# Patient Record
Sex: FEMALE | Race: BLACK OR AFRICAN AMERICAN | NOT HISPANIC OR LATINO
[De-identification: names, ages, dates, MRNs, and addresses within clinical notes are randomized per-mention and may not be internally consistent; named-entity substitution may affect disease eponyms.]

---

## 2017-01-05 PROCEDURE — 88305 TISSUE EXAM BY PATHOLOGIST: CPT | Performed by: INTERNAL MEDICINE

## 2017-01-19 ENCOUNTER — CHARTING TRANS (OUTPATIENT)
Dept: OTHER | Age: 47
End: 2017-01-19

## 2017-02-07 ENCOUNTER — TELEPHONE (OUTPATIENT)
Dept: NEUROLOGY | Facility: CLINIC | Age: 47
End: 2017-02-07

## 2017-02-24 ENCOUNTER — TELEPHONE (OUTPATIENT)
Dept: NEUROLOGY | Facility: CLINIC | Age: 47
End: 2017-02-24

## 2017-02-24 ENCOUNTER — OFFICE VISIT (OUTPATIENT)
Dept: NEUROLOGY | Facility: CLINIC | Age: 47
End: 2017-02-24

## 2017-02-24 VITALS
WEIGHT: 134 LBS | RESPIRATION RATE: 18 BRPM | SYSTOLIC BLOOD PRESSURE: 110 MMHG | BODY MASS INDEX: 26 KG/M2 | HEART RATE: 64 BPM | DIASTOLIC BLOOD PRESSURE: 64 MMHG

## 2017-02-24 DIAGNOSIS — G35 MULTIPLE SCLEROSIS (HCC): Primary | ICD-10-CM

## 2017-02-24 DIAGNOSIS — Z76.89 ENCOUNTER TO ESTABLISH CARE: ICD-10-CM

## 2017-02-24 PROCEDURE — 99204 OFFICE O/P NEW MOD 45 MIN: CPT | Performed by: OTHER

## 2017-02-24 NOTE — PATIENT INSTRUCTIONS
Refill policies:    • Allow 2 business days for refills; controlled substances may take longer.   • Contact your pharmacy at least 5 days prior to running out of medication and have them send an electronic request or submit request through the “request re your physician has recommended that you have a procedure or additional testing performed. [facility]) will contact your insurance carrier to obtain pre-certification or prior authorization.     Unfortunately, GRANT has seen an increas

## 2017-02-24 NOTE — PROGRESS NOTES
HPI:    Patient ID: Jordan Van is a 55year old female. HPI    Vibha Kang is a 55year old female with history of Multiple sclerosis who presented to establish care with us.  She was diagnosed with MS in 2009 following cervical myelopathy symptom Prescriptions:  Interferon Beta-1a (AVONEX IM) Inject  into the muscle once a week. Disp:  Rfl:    omeprazole 20 MG Oral Capsule Delayed Release Take 1 capsule (20 mg total) by mouth daily.  Disp: 30 capsule Rfl: 11   Pantoprazole Sodium 40 MG Oral Tab EC T to shin test. Normal rapid alternating movements. Gait: Normal gait         ASSESSMENT/PLAN:   Multiple sclerosis (hcc)  (primary encounter diagnosis)  Encounter to establish care      Patient presented to establish care with us for MS.  She is neurologica

## 2017-03-01 NOTE — TELEPHONE ENCOUNTER
Rec'd fax from [address] stating that no authrization is required from [employer]. Strengths/Dosage approved: 63 & 94 mcg/0.5mL solution pen-injector, up to 1 kit per 180 days.     ID Number: [id number]  Case (Certification) Number: [id number]

## 2017-03-04 ENCOUNTER — LAB ENCOUNTER (OUTPATIENT)
Dept: LAB | Age: 47
End: 2017-03-04
Attending: Other
Payer: COMMERCIAL

## 2017-03-04 DIAGNOSIS — G35 MULTIPLE SCLEROSIS (HCC): ICD-10-CM

## 2017-03-04 LAB
25-HYDROXYVITAMIN D (TOTAL): 30.1 NG/ML (ref 30–100)
ALBUMIN SERPL-MCNC: 3.5 G/DL (ref 3.5–4.8)
ALP LIVER SERPL-CCNC: 44 U/L (ref 39–100)
ALT SERPL-CCNC: 27 U/L (ref 14–54)
AST SERPL-CCNC: 27 U/L (ref 15–41)
BASOPHILS # BLD AUTO: 0.06 X10(3) UL (ref 0–0.1)
BASOPHILS NFR BLD AUTO: 0.9 %
BILIRUB SERPL-MCNC: 0.3 MG/DL (ref 0.1–2)
BUN BLD-MCNC: 14 MG/DL (ref 8–20)
CALCIUM BLD-MCNC: 9.2 MG/DL (ref 8.3–10.3)
CHLORIDE: 103 MMOL/L (ref 101–111)
CO2: 27 MMOL/L (ref 22–32)
CREAT BLD-MCNC: 0.75 MG/DL (ref 0.55–1.02)
EOSINOPHIL # BLD AUTO: 0.27 X10(3) UL (ref 0–0.3)
EOSINOPHIL NFR BLD AUTO: 3.9 %
ERYTHROCYTE [DISTWIDTH] IN BLOOD BY AUTOMATED COUNT: 13.3 % (ref 11.5–16)
GLUCOSE BLD-MCNC: 94 MG/DL (ref 70–99)
HCT VFR BLD AUTO: 37.4 % (ref 34–50)
HGB BLD-MCNC: 12.3 G/DL (ref 12–16)
IMMATURE GRANULOCYTE COUNT: 0.05 X10(3) UL (ref 0–1)
IMMATURE GRANULOCYTE RATIO %: 0.7 %
LYMPHOCYTES # BLD AUTO: 2.74 X10(3) UL (ref 0.9–4)
LYMPHOCYTES NFR BLD AUTO: 39.3 %
M PROTEIN MFR SERPL ELPH: 7.4 G/DL (ref 6.1–8.3)
MCH RBC QN AUTO: 28.7 PG (ref 27–33.2)
MCHC RBC AUTO-ENTMCNC: 32.9 G/DL (ref 31–37)
MCV RBC AUTO: 87.2 FL (ref 81–100)
MONOCYTES # BLD AUTO: 0.54 X10(3) UL (ref 0.1–0.6)
MONOCYTES NFR BLD AUTO: 7.7 %
NEUTROPHIL ABS PRELIM: 3.31 X10 (3) UL (ref 1.3–6.7)
NEUTROPHILS # BLD AUTO: 3.31 X10(3) UL (ref 1.3–6.7)
NEUTROPHILS NFR BLD AUTO: 47.5 %
PLATELET # BLD AUTO: 279 10(3)UL (ref 150–450)
POTASSIUM SERPL-SCNC: 4 MMOL/L (ref 3.6–5.1)
RBC # BLD AUTO: 4.29 X10(6)UL (ref 3.8–5.1)
RED CELL DISTRIBUTION WIDTH-SD: 42.1 FL (ref 35.1–46.3)
SODIUM SERPL-SCNC: 137 MMOL/L (ref 136–144)
WBC # BLD AUTO: 7 X10(3) UL (ref 4–13)

## 2017-03-04 PROCEDURE — 36415 COLL VENOUS BLD VENIPUNCTURE: CPT

## 2017-03-04 PROCEDURE — 85025 COMPLETE CBC W/AUTO DIFF WBC: CPT

## 2017-03-04 PROCEDURE — 80053 COMPREHEN METABOLIC PANEL: CPT

## 2017-03-04 PROCEDURE — 82306 VITAMIN D 25 HYDROXY: CPT

## 2017-03-06 ENCOUNTER — TELEPHONE (OUTPATIENT)
Dept: NEUROLOGY | Facility: CLINIC | Age: 47
End: 2017-03-06

## 2017-03-06 NOTE — TELEPHONE ENCOUNTER
Patient states she has not received a call from [employer] regarding confirmation; however she states she has not checked her voicemail yet. Provided patient with [employer] phone number.  Patient verbalized understanding and has no further questions or concerns at

## 2017-03-06 NOTE — TELEPHONE ENCOUNTER
Relayed test results. Patient verbalized understanding and has no further questions or concerns at this time.

## 2017-03-09 ENCOUNTER — TELEPHONE (OUTPATIENT)
Dept: NEUROLOGY | Facility: CLINIC | Age: 47
End: 2017-03-09

## 2017-03-09 NOTE — TELEPHONE ENCOUNTER
Dr Ivan Redd asking if okay to do tooth extraction with patient on Betaseron. Dr Huerta Coil not in this office today. Per Dr Юлия Steward, as long as patient has not just started Betaseron, it should be okay. Dr Ivan eRdd informed and verbalized understanding.

## 2017-03-13 NOTE — TELEPHONE ENCOUNTER
Dr. Angela Merida received medical records, reviewed, and initialed medical records. Medical records sent to scan.

## 2017-03-20 ENCOUNTER — TELEPHONE (OUTPATIENT)
Dept: NEUROLOGY | Facility: CLINIC | Age: 47
End: 2017-03-20

## 2017-03-20 NOTE — TELEPHONE ENCOUNTER
See TE 2/24/17- patient may be checking on the status of Plegridy injections. Left detailed message for patient with Silva [phone] and to call office back if patient has any further questions.

## 2017-04-03 NOTE — TELEPHONE ENCOUNTER
Received fax from [employer] requesting product replacement for Plegridy 63 mcg pen. Request initiated and placed on Dr Charly Colbert desk for signature. [facility]. Confirmation received.

## 2017-04-18 NOTE — TELEPHONE ENCOUNTER
Contacted patient to obtain a condition update and follow up on the [address] [place] [address]. Patient is currently at work and having a bad connection on her phone. Patient states she will call back later.

## 2017-04-24 NOTE — TELEPHONE ENCOUNTER
Patient will be taking 2nd dose of Plegridy May 1. Patient voicing concern about increasing Plegridy. She would prefer to stay at lower dose. States no side effects to Plegridy so far.

## 2017-04-25 ENCOUNTER — TELEPHONE (OUTPATIENT)
Dept: NEUROLOGY | Facility: CLINIC | Age: 47
End: 2017-04-25

## 2017-04-25 DIAGNOSIS — G35 MS (MULTIPLE SCLEROSIS) (HCC): Primary | ICD-10-CM

## 2017-04-25 NOTE — TELEPHONE ENCOUNTER
Left message on patient's cell. When she calls back, please verify: How is she tolerating it? We have received information from "[employer]" that she has had injection training on 04/13/17.   PA for maintenance dose needs to be submitted if she is tolerating

## 2017-04-28 PROBLEM — G35 MS (MULTIPLE SCLEROSIS) (HCC): Status: ACTIVE | Noted: 2017-04-28

## 2017-04-28 NOTE — TELEPHONE ENCOUNTER
Patient states she is tolerating Plegridy well with no complaints of side effects or adverse reactions. She would like to proceed with the maintenance dose. PA initiated for maintenance dose and routed to PA department.     Reminded patient of upcoming appo

## 2017-06-05 ENCOUNTER — OFFICE VISIT (OUTPATIENT)
Dept: NEUROLOGY | Facility: CLINIC | Age: 47
End: 2017-06-05

## 2017-06-05 ENCOUNTER — LAB ENCOUNTER (OUTPATIENT)
Dept: LAB | Age: 47
End: 2017-06-05
Attending: Other
Payer: COMMERCIAL

## 2017-06-05 ENCOUNTER — TELEPHONE (OUTPATIENT)
Dept: NEUROLOGY | Facility: CLINIC | Age: 47
End: 2017-06-05

## 2017-06-05 VITALS
WEIGHT: 141 LBS | SYSTOLIC BLOOD PRESSURE: 110 MMHG | BODY MASS INDEX: 28 KG/M2 | HEART RATE: 88 BPM | DIASTOLIC BLOOD PRESSURE: 60 MMHG

## 2017-06-05 DIAGNOSIS — T50.905A MEDICATION SIDE EFFECT, INITIAL ENCOUNTER: ICD-10-CM

## 2017-06-05 DIAGNOSIS — G35 MULTIPLE SCLEROSIS (HCC): Primary | ICD-10-CM

## 2017-06-05 DIAGNOSIS — G35 MULTIPLE SCLEROSIS (HCC): ICD-10-CM

## 2017-06-05 PROCEDURE — 80053 COMPREHEN METABOLIC PANEL: CPT | Performed by: OTHER

## 2017-06-05 PROCEDURE — 99213 OFFICE O/P EST LOW 20 MIN: CPT | Performed by: OTHER

## 2017-06-05 PROCEDURE — 36415 COLL VENOUS BLD VENIPUNCTURE: CPT | Performed by: OTHER

## 2017-06-05 PROCEDURE — 85025 COMPLETE CBC W/AUTO DIFF WBC: CPT | Performed by: OTHER

## 2017-06-05 RX ORDER — PEGINTERFERON BETA-1A 125 UG/.5ML
INJECTION, SOLUTION SUBCUTANEOUS
Refills: 2 | COMMUNITY
Start: 2017-05-16 | End: 2017-06-15 | Stop reason: ALTCHOICE

## 2017-06-05 RX ORDER — IBUPROFEN 200 MG
200 TABLET ORAL AS NEEDED
COMMUNITY
End: 2019-01-02

## 2017-06-05 NOTE — PROGRESS NOTES
HPI:    Patient ID: Jimenez De La Cruz is a 55year old female. Multiple Sclerosis  Associated symptoms include fatigue. Pertinent negatives include no numbness or weakness.        Ariane Neither is a 55year old female who presented for follow up with MS. I alyse MG Oral Tab Take 200 mg by mouth as needed for Pain. Disp:  Rfl:      Allergies:  Bactrim [Sulfametho*       PHYSICAL EXAM:   Physical Exam      Blood pressure 110/60, pulse 88, weight 141 lb.     Vitals reviewed  General: well developed, well nourished  He ordered in this encounter       Imaging & Referrals:  None     #[phone]

## 2017-06-05 NOTE — TELEPHONE ENCOUNTER
Patient in office today. Patient states she has been experiencing side effects to Plegridy- flu like symptoms, general malaise, fatigue. Per Dr Juan Luis Grossman: will switch to Aubagio     Started PA via Covermymeds.  Key: [id number]    CBC, CMP, TB skin test ordered

## 2017-06-05 NOTE — PATIENT INSTRUCTIONS
Refill policies:    • Allow 2-3 business days for refills; controlled substances may take longer.   • Contact your pharmacy at least 5 days prior to running out of medication and have them send an electronic request or submit request through the [facility] have a procedure or additional testing performed. [facility]) will contact your insurance carrier to obtain pre-certification or prior authorization.     Unfortunately, GRANT has seen an increase in denial of payment even though the p

## 2017-06-05 NOTE — TELEPHONE ENCOUNTER
Patient in office today. Patient states she has been experiencing side effects to Plegridy- flu like symptoms, general malaise, fatigue. Per Dr Leon Bowling: will switch to Aubagio     Started PA via Covermymeds.  Key: [facility]

## 2017-06-06 NOTE — TELEPHONE ENCOUNTER
Received note that Aubagio covered under plan. Awaiting TB results. Approval letter placed in pending file with paperwork to be faxed in once labs, Tb test completed.

## 2017-07-05 NOTE — TELEPHONE ENCOUNTER
Completed Start Form faxed to [employer], along with facesheet, copy of insurance card, and authorization letter. Fax confirmation rec'd. Monthly ALT orders entered as standing order. Start Form sent to scanning.

## 2017-07-05 NOTE — TELEPHONE ENCOUNTER
Left detailed message for patient (OK per HIPAA) explaining that Start Form has been sent, and that she will be contacted by her specialty pharmacy to arranged for payment and shipment.   Also informed her of need for monthly ALT, and indicated that orders

## 2017-08-07 ENCOUNTER — TELEPHONE (OUTPATIENT)
Dept: NEUROLOGY | Facility: CLINIC | Age: 47
End: 2017-08-07

## 2017-08-07 ENCOUNTER — OFFICE VISIT (OUTPATIENT)
Dept: NEUROLOGY | Facility: CLINIC | Age: 47
End: 2017-08-07

## 2017-08-07 VITALS
WEIGHT: 140 LBS | BODY MASS INDEX: 27 KG/M2 | SYSTOLIC BLOOD PRESSURE: 126 MMHG | HEART RATE: 62 BPM | DIASTOLIC BLOOD PRESSURE: 74 MMHG | RESPIRATION RATE: 18 BRPM

## 2017-08-07 DIAGNOSIS — K59.00 CONSTIPATION, UNSPECIFIED CONSTIPATION TYPE: ICD-10-CM

## 2017-08-07 DIAGNOSIS — G35 MS (MULTIPLE SCLEROSIS) (HCC): Primary | ICD-10-CM

## 2017-08-07 PROCEDURE — 99213 OFFICE O/P EST LOW 20 MIN: CPT | Performed by: OTHER

## 2017-08-07 NOTE — PROGRESS NOTES
HPI:    Patient ID: Norma Espinal is a 55year old female. Multiple Sclerosis   Associated symptoms include fatigue. Pertinent negatives include no numbness or weakness. Robson Atkinson is a 55year old female who presented for follow up with MS.  We s Prescriptions:  Teriflunomide (AUBAGIO) 14 MG Oral Tab Take 14 mg by mouth daily. Disp: 28 tablet Rfl: 12   ibuprofen (ADVIL) 200 MG Oral Tab Take 200 mg by mouth as needed for Pain.  Disp:  Rfl:    hydrocortisone Ace-Pramoxine 1-1 % Rectal Foam Place 1 scott encounter. Thank you for allowing us to participate in your patient's care.       Anny Lakhani MD  [facility] This Visit:    No prescriptions requested or ordered in this encounter    Imaging & Referrals:  None     ID#1

## 2017-08-07 NOTE — PATIENT INSTRUCTIONS
Refill policies:    • Allow 2-3 business days for refills; controlled substances may take longer.   • Contact your pharmacy at least 5 days prior to running out of medication and have them send an electronic request or submit request through the [facility] have a procedure or additional testing performed. [facility]) will contact your insurance carrier to obtain pre-certification or prior authorization.     Unfortunately, GRANT has seen an increase in denial of payment even though the p

## 2017-08-08 ENCOUNTER — TELEPHONE (OUTPATIENT)
Dept: NEUROLOGY | Facility: CLINIC | Age: 47
End: 2017-08-08

## 2017-08-09 NOTE — TELEPHONE ENCOUNTER
Spoke with Renata Monzon at [employer]. Informed her no PA on Plegridy because patient is no longer taking.

## 2017-08-11 ENCOUNTER — TELEPHONE (OUTPATIENT)
Dept: NEUROLOGY | Facility: CLINIC | Age: 47
End: 2017-08-11

## 2017-08-15 ENCOUNTER — TELEPHONE (OUTPATIENT)
Dept: NEUROLOGY | Facility: CLINIC | Age: 47
End: 2017-08-15

## 2017-09-01 ENCOUNTER — TELEPHONE (OUTPATIENT)
Dept: OBGYN CLINIC | Facility: CLINIC | Age: 47
End: 2017-09-01

## 2017-09-01 RX ORDER — VALACYCLOVIR HYDROCHLORIDE 1 G/1
1 TABLET, FILM COATED ORAL DAILY
Qty: 5 TABLET | Refills: 6 | Status: SHIPPED | OUTPATIENT
Start: 2017-09-01 | End: 2017-09-06

## 2017-09-01 NOTE — TELEPHONE ENCOUNTER
Spoke with patient, would like refill of Valrex until able to come in for exam. eRx will be done today.

## 2017-09-01 NOTE — TELEPHONE ENCOUNTER
Pt wants to speak with Dr Valdo Garcia only about new meds (Aubagio) that causes her to get frequent urination and reoccurring yeast infections. Pt wants to know if Dr Valdo Garcia can give her something to keep down the infections.

## 2017-09-06 NOTE — TELEPHONE ENCOUNTER
Left a detailed message on patient's voicemail (ok per HIPPA) reminding patient she is due for monthly ALT labs.

## 2017-09-28 ENCOUNTER — TELEPHONE (OUTPATIENT)
Dept: NEUROLOGY | Facility: CLINIC | Age: 47
End: 2017-09-28

## 2017-09-30 ENCOUNTER — APPOINTMENT (OUTPATIENT)
Dept: LAB | Age: 47
End: 2017-09-30
Attending: Other
Payer: COMMERCIAL

## 2017-09-30 DIAGNOSIS — G35 MULTIPLE SCLEROSIS (HCC): ICD-10-CM

## 2017-09-30 PROCEDURE — 84460 ALANINE AMINO (ALT) (SGPT): CPT

## 2017-09-30 PROCEDURE — 36415 COLL VENOUS BLD VENIPUNCTURE: CPT

## 2017-10-07 ENCOUNTER — HOSPITAL ENCOUNTER (EMERGENCY)
Age: 47
Discharge: HOME | End: 2017-10-07
Attending: EMERGENCY MEDICINE
Payer: COMMERCIAL

## 2017-10-07 VITALS
BODY MASS INDEX: 26.5 KG/M2 | HEART RATE: 96 BPM | WEIGHT: 135 LBS | TEMPERATURE: 98 F | RESPIRATION RATE: 20 BRPM | SYSTOLIC BLOOD PRESSURE: 145 MMHG | HEIGHT: 60 IN | OXYGEN SATURATION: 100 % | DIASTOLIC BLOOD PRESSURE: 86 MMHG

## 2017-10-07 DIAGNOSIS — M54.50 BACK PAIN, LUMBOSACRAL: Primary | ICD-10-CM

## 2017-10-07 PROCEDURE — 81003 URINALYSIS AUTO W/O SCOPE: CPT | Performed by: EMERGENCY MEDICINE

## 2017-10-07 PROCEDURE — 99283 EMERGENCY DEPT VISIT LOW MDM: CPT

## 2017-10-07 RX ORDER — NAPROXEN 500 MG/1
500 TABLET ORAL 2 TIMES DAILY PRN
Qty: 20 TABLET | Refills: 0 | Status: SHIPPED | OUTPATIENT
Start: 2017-10-07 | End: 2017-10-14

## 2017-10-07 RX ORDER — DIAZEPAM 5 MG/1
5 TABLET ORAL 3 TIMES DAILY PRN
Qty: 20 TABLET | Refills: 0 | Status: SHIPPED | OUTPATIENT
Start: 2017-10-07 | End: 2017-10-14

## 2017-10-07 NOTE — ED PROVIDER NOTES
Patient Seen in: [facility] Emergency Department In [place]    History   Patient presents with:  Back Pain (musculoskeletal)  Abdomen/Flank Pain (GI/)    Stated Complaint: right flank/back pain since last week, denies dysuria    HPI    15-year-old femal [10/07/17 0915]  BP: 145/86  Pulse: 96  Resp: 20  Temp: 98.3 °F (36.8 °C)  Temp src: Temporal  SpO2: 100 %  O2 Device: n/a    Current:/86   Pulse 96   Temp 98.3 °F (36.8 °C) (Temporal)   Resp 20   Ht 152.4 cm (5')   Wt 61.2 kg   LMP  (Exact Date)   S visit        Medications Prescribed:  Current Discharge Medication List    START taking these medications    diazepam 5 MG Oral Tab  Take 1 tablet (5 mg total) by mouth 3 (three) times daily as needed for Anxiety.   Qty: 20 tablet Refills: 0    naproxen 500

## 2017-10-07 NOTE — ED INITIAL ASSESSMENT (HPI)
Pt has had r flank pain for about a week unsure of injury having worsening pain this morning. Pt has a hx of ms.

## 2017-10-19 ENCOUNTER — OFFICE VISIT (OUTPATIENT)
Dept: FAMILY MEDICINE CLINIC | Facility: CLINIC | Age: 47
End: 2017-10-19

## 2017-10-19 VITALS
OXYGEN SATURATION: 98 % | WEIGHT: 135 LBS | HEIGHT: 60 IN | DIASTOLIC BLOOD PRESSURE: 68 MMHG | HEART RATE: 111 BPM | SYSTOLIC BLOOD PRESSURE: 100 MMHG | TEMPERATURE: 98 F | RESPIRATION RATE: 18 BRPM | BODY MASS INDEX: 26.5 KG/M2

## 2017-10-19 DIAGNOSIS — K64.4 EXTERNAL HEMORRHOIDS: ICD-10-CM

## 2017-10-19 DIAGNOSIS — L30.9 ECZEMA, UNSPECIFIED TYPE: ICD-10-CM

## 2017-10-19 DIAGNOSIS — G35 MS (MULTIPLE SCLEROSIS) (HCC): Primary | ICD-10-CM

## 2017-10-19 PROCEDURE — 99204 OFFICE O/P NEW MOD 45 MIN: CPT | Performed by: INTERNAL MEDICINE

## 2017-10-19 RX ORDER — FLUTICASONE PROPIONATE 0.05 %
CREAM (GRAM) TOPICAL
Qty: 15 G | Refills: 0 | Status: SHIPPED | OUTPATIENT
Start: 2017-10-19 | End: 2019-02-04

## 2017-10-29 NOTE — PROGRESS NOTES
Stacey Pappas is a 52year old female. HPI:   New pt here with many concerns. Diagnosed with MS in her early 29's. Used to be on injections with superficial skin SEs. Switched to oral medication less than a year ago.  Needs a referral for MS mgmt with nourished,in no apparent distress  SKIN: warm & dry; dry patches of skin on hands and arms, no excoriation  HEENT: atraumatic, normocephalic, TMs clear, throat clear  NECK: supple,no adenopathy   LUNGS: CTA, easy breathing  CV: normal S1S2, RRR without mur

## 2017-10-30 ENCOUNTER — TELEPHONE (OUTPATIENT)
Dept: NEUROLOGY | Facility: CLINIC | Age: 47
End: 2017-10-30

## 2017-10-30 NOTE — TELEPHONE ENCOUNTER
Rec'd incoming fax from [address] [place] stating that they have been unsuccessful at contacting the patient regarding her prescription. If they are unable to reach the patient, they state that her Rx will be placed on hold.

## 2017-11-10 ENCOUNTER — OFFICE VISIT (OUTPATIENT)
Dept: NEUROLOGY | Facility: CLINIC | Age: 47
End: 2017-11-10

## 2017-11-10 VITALS
WEIGHT: 141 LBS | DIASTOLIC BLOOD PRESSURE: 80 MMHG | SYSTOLIC BLOOD PRESSURE: 120 MMHG | HEART RATE: 64 BPM | BODY MASS INDEX: 28 KG/M2

## 2017-11-10 DIAGNOSIS — G35 MULTIPLE SCLEROSIS (HCC): Primary | ICD-10-CM

## 2017-11-10 PROCEDURE — 99213 OFFICE O/P EST LOW 20 MIN: CPT | Performed by: OTHER

## 2017-11-10 NOTE — PATIENT INSTRUCTIONS
Refill policies:    • Allow 2-3 business days for refills; controlled substances may take longer.   • Contact your pharmacy at least 5 days prior to running out of medication and have them send an electronic request or submit request through the [facility] have a procedure or additional testing performed. [facility]) will contact your insurance carrier to obtain pre-certification or prior authorization.     Unfortunately, GRANT has seen an increase in denial of payment even though the p

## 2017-11-10 NOTE — PROGRESS NOTES
Patient here to follow up for MS. States she discontinued Aubagio a month ago due to sever flu like symptoms. States she is feeling good since she stopped medication.

## 2017-11-16 NOTE — PROGRESS NOTES
HPI:    Patient ID: Erick Joya is a 52year old female. Multiple Sclerosis   Associated symptoms include fatigue. Pertinent negatives include no numbness or weakness. Barrett Joy is a 55year old female who presented for follow up with MS.  Stat reviewed and are negative.              Current Outpatient Prescriptions:  Fluticasone Propionate 0.05 % External Cream Apply 2-3 times daily for up to 10 days Disp: 15 g Rfl: 0   hydrocortisone Ace-Pramoxine 1-1 % Rectal Foam Place 1 applicator rectally 2 burden before making any decision. Patient agreeable    We will call her with results and discuss further plan of care    RTC in 3 months. See orders and medications filed with this encounter.  The patient indicates understanding of these issues and agr

## 2017-12-30 ENCOUNTER — OFFICE VISIT (OUTPATIENT)
Dept: FAMILY MEDICINE CLINIC | Facility: CLINIC | Age: 47
End: 2017-12-30

## 2017-12-30 VITALS
HEIGHT: 60 IN | RESPIRATION RATE: 16 BRPM | SYSTOLIC BLOOD PRESSURE: 148 MMHG | BODY MASS INDEX: 28.07 KG/M2 | DIASTOLIC BLOOD PRESSURE: 78 MMHG | HEART RATE: 84 BPM | TEMPERATURE: 98 F | WEIGHT: 143 LBS

## 2017-12-30 DIAGNOSIS — G35 MS (MULTIPLE SCLEROSIS) (HCC): Primary | ICD-10-CM

## 2017-12-30 DIAGNOSIS — M25.561 CHRONIC PAIN OF BOTH KNEES: ICD-10-CM

## 2017-12-30 DIAGNOSIS — K59.09 OTHER CONSTIPATION: ICD-10-CM

## 2017-12-30 DIAGNOSIS — E55.9 VITAMIN D DEFICIENCY: ICD-10-CM

## 2017-12-30 DIAGNOSIS — G89.29 CHRONIC PAIN OF BOTH KNEES: ICD-10-CM

## 2017-12-30 DIAGNOSIS — M25.562 CHRONIC PAIN OF BOTH KNEES: ICD-10-CM

## 2017-12-30 PROCEDURE — 99214 OFFICE O/P EST MOD 30 MIN: CPT | Performed by: FAMILY MEDICINE

## 2017-12-30 NOTE — PROGRESS NOTES
HPI:   Tobi Boast is a 52year old female here to follow up on MS and hemorrhoids and other concerns     Pt was diagnosed with MS in the 80's  Bakari philippe retired; pt reestablished with neuro at [facility] had stopped meds  Pt has not ha Smokeless tobacco: Never Used                      Alcohol use: No              Occ: . : . Children: .    Exercise: minimal.  Diet: watches minimally     REVIEW OF SYSTEMS:   GENERAL: feels well otherwise  SKIN: shanthi Future    Questions answered and patient indicates understanding of these issues and agrees to the plan. Follow up in 3 mo or sooner if needed.

## 2018-01-03 ENCOUNTER — NURSE ONLY (OUTPATIENT)
Dept: FAMILY MEDICINE CLINIC | Facility: CLINIC | Age: 48
End: 2018-01-03

## 2018-01-03 VITALS — DIASTOLIC BLOOD PRESSURE: 68 MMHG | SYSTOLIC BLOOD PRESSURE: 112 MMHG

## 2018-01-11 ENCOUNTER — TELEPHONE (OUTPATIENT)
Dept: FAMILY MEDICINE CLINIC | Facility: CLINIC | Age: 48
End: 2018-01-11

## 2018-01-11 NOTE — TELEPHONE ENCOUNTER
Pt called to request a call back from the nurse to discuss new symptoms she developed after taking calcium 4000mg, pt is having stiffness on her middle finger on her left hand, pt is asking if she should take less calcium? Please call pt and advise.

## 2018-01-11 NOTE — TELEPHONE ENCOUNTER
Pt called to request a call back from the nurse to discuss new symptoms she developed after taking calcium 4000mg, pt is having stiffness on her middle finger on her left hand, pt is asking if she should take less calcium?      Do you think this is related

## 2018-02-09 ENCOUNTER — TELEPHONE (OUTPATIENT)
Dept: NEUROLOGY | Facility: CLINIC | Age: 48
End: 2018-02-09

## 2018-02-09 NOTE — TELEPHONE ENCOUNTER
Left message on patient identified voicemail (ok with HIPPA consent) informing patient next Friday available for Dr Maxi Hong is 3/9/18.

## 2018-02-13 NOTE — TELEPHONE ENCOUNTER
Left message on patient identified voicemail (ok with HIPPA consent) informing patient of below and requesting her to CB to let us know which she prefers.

## 2018-02-14 NOTE — TELEPHONE ENCOUNTER
Pt returned call from message, offering her a 4pm appt today. Pt would like to keep appt on 2/16/18 at 4pm, as she has already asked for the time off from work.

## 2018-02-16 ENCOUNTER — HOSPITAL ENCOUNTER (OUTPATIENT)
Dept: MRI IMAGING | Age: 48
Discharge: HOME | End: 2018-02-16
Attending: Other
Payer: COMMERCIAL

## 2018-02-16 ENCOUNTER — OFFICE VISIT (OUTPATIENT)
Dept: NEUROLOGY | Facility: CLINIC | Age: 48
End: 2018-02-16

## 2018-02-16 VITALS
BODY MASS INDEX: 28 KG/M2 | RESPIRATION RATE: 16 BRPM | HEART RATE: 80 BPM | DIASTOLIC BLOOD PRESSURE: 74 MMHG | WEIGHT: 144 LBS | SYSTOLIC BLOOD PRESSURE: 118 MMHG

## 2018-02-16 DIAGNOSIS — G35 MULTIPLE SCLEROSIS (HCC): ICD-10-CM

## 2018-02-16 DIAGNOSIS — G35 MS (MULTIPLE SCLEROSIS) (HCC): Primary | ICD-10-CM

## 2018-02-16 PROCEDURE — 99213 OFFICE O/P EST LOW 20 MIN: CPT | Performed by: OTHER

## 2018-02-16 PROCEDURE — 70553 MRI BRAIN STEM W/O & W/DYE: CPT | Performed by: OTHER

## 2018-02-16 PROCEDURE — A9575 INJ GADOTERATE MEGLUMI 0.1ML: HCPCS | Performed by: OTHER

## 2018-02-16 NOTE — PATIENT INSTRUCTIONS
Refill policies:    • Allow 2-3 business days for refills; controlled substances may take longer.   • Contact your pharmacy at least 5 days prior to running out of medication and have them send an electronic request or submit request through the [facility] recommended that you have a procedure or additional testing performed. [facility]) will contact your insurance carrier to obtain pre-certification or prior authorization.     Unfortunately, [facility] has seen an increase in denial of paym

## 2018-02-20 ENCOUNTER — TELEPHONE (OUTPATIENT)
Dept: NEUROLOGY | Facility: CLINIC | Age: 48
End: 2018-02-20

## 2018-02-20 NOTE — TELEPHONE ENCOUNTER
----- Message from Antonio Beckett MD sent at 2/20/2018 10:11 AM CST -----  Moderate burden of demyelinating plaques. No e/o active demyelination.

## 2018-02-20 NOTE — PROGRESS NOTES
HPI:    Patient ID: Nathan Amaya is a 52year old female. HPI    Helen Najera is a pleasant 52year old female who presented for follow up for Multiple Sclerosis. Patient elected to manage it conservatively and with natural supplements.    States she fee Disp:  Rfl:    Multiple Vitamins-Minerals (WOMENS ONE DAILY OR) Take by mouth daily. Disp:  Rfl:    Multiple Vitamins-Minerals (AIR[place]ORNE OR) Take by mouth as needed. Disp:  Rfl:    Zinc 40 MG Oral Tab Take by mouth daily.  Disp:  Rfl:    PROCTOZONE-HC 2.5 % groups  Reflexes: symmetric and brisk throughout  Coordination: Intact finger to nose and heel to shin test. Normal rapid alternating movements. Gait: Normal based.  Mild difficulty with tandem walk ( states it is due to the pain right knee )         ASSES

## 2018-02-20 NOTE — TELEPHONE ENCOUNTER
Patient informed of below. Asking if we received MRI result from previous neurologist. Rubia Curtis to find MRI in previous records. Left message on patient identified voicemail (ok with HIPPA consent) informing patient. Aurora Gil

## 2018-03-29 NOTE — PROGRESS NOTES
HPI:   Feliciano Fenton is a 52year old female here to follow up on MS, back pain and right knee pain        Pt was diagnosed with MS in the 80's  Pts [address] [place] [address] neuro retired; pt reestablished with neuro at [facility] had stopped meds  Pt has not had Family History   Problem Relation Age of Onset   • Breast Cancer Mother    • Diabetes Mother    • Cancer Mother 64   • Hypertension Mother       Social History:   Smoking status: Never Smoker                                                              S [facility]ERR[facility]    3. Chronic pain of right knee    - OP REFERRAL [facility]      Questions answered and patient indicates understanding of these issues and agrees to the plan.   Follow up in 3 mo or soone

## 2018-03-30 ENCOUNTER — OFFICE VISIT (OUTPATIENT)
Dept: FAMILY MEDICINE CLINIC | Facility: CLINIC | Age: 48
End: 2018-03-30

## 2018-03-30 VITALS
RESPIRATION RATE: 20 BRPM | WEIGHT: 143 LBS | OXYGEN SATURATION: 98 % | HEART RATE: 72 BPM | SYSTOLIC BLOOD PRESSURE: 124 MMHG | DIASTOLIC BLOOD PRESSURE: 78 MMHG | HEIGHT: 60 IN | TEMPERATURE: 99 F | BODY MASS INDEX: 28.07 KG/M2

## 2018-03-30 DIAGNOSIS — M54.6 ACUTE RIGHT-SIDED THORACIC BACK PAIN: ICD-10-CM

## 2018-03-30 DIAGNOSIS — M25.561 CHRONIC PAIN OF RIGHT KNEE: ICD-10-CM

## 2018-03-30 DIAGNOSIS — G89.29 CHRONIC PAIN OF RIGHT KNEE: ICD-10-CM

## 2018-03-30 DIAGNOSIS — G35 MS (MULTIPLE SCLEROSIS) (HCC): Primary | ICD-10-CM

## 2018-03-30 PROCEDURE — 99213 OFFICE O/P EST LOW 20 MIN: CPT | Performed by: FAMILY MEDICINE

## 2018-04-23 ENCOUNTER — APPOINTMENT (OUTPATIENT)
Dept: PHYSICAL THERAPY | Age: 48
End: 2018-04-23
Attending: FAMILY MEDICINE
Payer: COMMERCIAL

## 2018-04-23 ENCOUNTER — TELEPHONE (OUTPATIENT)
Dept: PHYSICAL THERAPY | Age: 48
End: 2018-04-23

## 2018-04-25 ENCOUNTER — APPOINTMENT (OUTPATIENT)
Dept: PHYSICAL THERAPY | Age: 48
End: 2018-04-25
Attending: FAMILY MEDICINE
Payer: COMMERCIAL

## 2018-04-25 ENCOUNTER — TELEPHONE (OUTPATIENT)
Dept: PHYSICAL THERAPY | Age: 48
End: 2018-04-25

## 2018-04-30 ENCOUNTER — APPOINTMENT (OUTPATIENT)
Dept: PHYSICAL THERAPY | Age: 48
End: 2018-04-30
Attending: FAMILY MEDICINE
Payer: COMMERCIAL

## 2018-05-02 ENCOUNTER — APPOINTMENT (OUTPATIENT)
Dept: PHYSICAL THERAPY | Age: 48
End: 2018-05-02
Attending: FAMILY MEDICINE
Payer: COMMERCIAL

## 2018-05-07 ENCOUNTER — APPOINTMENT (OUTPATIENT)
Dept: PHYSICAL THERAPY | Age: 48
End: 2018-05-07
Attending: FAMILY MEDICINE
Payer: COMMERCIAL

## 2018-05-09 ENCOUNTER — APPOINTMENT (OUTPATIENT)
Dept: PHYSICAL THERAPY | Age: 48
End: 2018-05-09
Attending: FAMILY MEDICINE
Payer: COMMERCIAL

## 2018-05-14 ENCOUNTER — APPOINTMENT (OUTPATIENT)
Dept: PHYSICAL THERAPY | Age: 48
End: 2018-05-14
Attending: FAMILY MEDICINE
Payer: COMMERCIAL

## 2018-05-16 ENCOUNTER — APPOINTMENT (OUTPATIENT)
Dept: PHYSICAL THERAPY | Age: 48
End: 2018-05-16
Attending: FAMILY MEDICINE
Payer: COMMERCIAL

## 2018-07-12 ENCOUNTER — OFFICE VISIT (OUTPATIENT)
Dept: FAMILY MEDICINE CLINIC | Facility: CLINIC | Age: 48
End: 2018-07-12

## 2018-07-12 ENCOUNTER — HOSPITAL ENCOUNTER (OUTPATIENT)
Dept: GENERAL RADIOLOGY | Age: 48
Discharge: HOME | End: 2018-07-12
Attending: FAMILY MEDICINE
Payer: COMMERCIAL

## 2018-07-12 VITALS
SYSTOLIC BLOOD PRESSURE: 124 MMHG | HEIGHT: 60 IN | TEMPERATURE: 98 F | RESPIRATION RATE: 16 BRPM | BODY MASS INDEX: 28.86 KG/M2 | HEART RATE: 74 BPM | DIASTOLIC BLOOD PRESSURE: 68 MMHG | WEIGHT: 147 LBS

## 2018-07-12 DIAGNOSIS — M25.562 CHRONIC PAIN OF BOTH KNEES: ICD-10-CM

## 2018-07-12 DIAGNOSIS — M25.561 CHRONIC PAIN OF BOTH KNEES: ICD-10-CM

## 2018-07-12 DIAGNOSIS — Z12.31 ENCOUNTER FOR SCREENING MAMMOGRAM FOR HIGH-RISK PATIENT: ICD-10-CM

## 2018-07-12 DIAGNOSIS — G35 MS (MULTIPLE SCLEROSIS) (HCC): Primary | ICD-10-CM

## 2018-07-12 DIAGNOSIS — G89.29 CHRONIC PAIN OF BOTH KNEES: ICD-10-CM

## 2018-07-12 PROCEDURE — 73565 X-RAY EXAM OF KNEES: CPT | Performed by: FAMILY MEDICINE

## 2018-07-12 PROCEDURE — 99213 OFFICE O/P EST LOW 20 MIN: CPT | Performed by: FAMILY MEDICINE

## 2018-07-12 RX ORDER — DOXYCYCLINE HYCLATE 100 MG
TABLET ORAL
Refills: 0 | COMMUNITY
Start: 2018-07-03 | End: 2018-07-25 | Stop reason: ALTCHOICE

## 2018-07-12 NOTE — PROGRESS NOTES
HPI:   Nathan Amaya is a 52year old female here to follow up on MS, bug bite and urinary concerns.       Pt was diagnosed with MS in the 80's  Bakari Munoz neuro retired; pt reestablished with neuro at [facility] had stopped meds  Pt has not had Relation Age of Onset   • Breast Cancer Mother    • Diabetes Mother    • Cancer Mother 64   • Hypertension Mother       Social History:   Smoking status: Never Smoker                                                              Smokeless tobacco: Never Use agrees to the plan. Follow up in 3 mo or sooner if needed.

## 2018-07-16 DIAGNOSIS — M25.562 CHRONIC PAIN OF BOTH KNEES: Primary | ICD-10-CM

## 2018-07-16 DIAGNOSIS — G89.29 CHRONIC PAIN OF BOTH KNEES: Primary | ICD-10-CM

## 2018-07-16 DIAGNOSIS — M25.561 CHRONIC PAIN OF BOTH KNEES: Primary | ICD-10-CM

## 2018-07-17 ENCOUNTER — HOSPITAL ENCOUNTER (OUTPATIENT)
Dept: MAMMOGRAPHY | Age: 48
Discharge: HOME | End: 2018-07-17
Attending: FAMILY MEDICINE
Payer: COMMERCIAL

## 2018-07-17 DIAGNOSIS — Z12.31 ENCOUNTER FOR SCREENING MAMMOGRAM FOR HIGH-RISK PATIENT: ICD-10-CM

## 2018-07-17 PROCEDURE — 77067 SCR MAMMO BI INCL CAD: CPT | Performed by: FAMILY MEDICINE

## 2018-07-17 PROCEDURE — 77063 BREAST TOMOSYNTHESIS BI: CPT | Performed by: FAMILY MEDICINE

## 2018-07-19 ENCOUNTER — HOSPITAL ENCOUNTER (OUTPATIENT)
Dept: ULTRASOUND IMAGING | Age: 48
Discharge: HOME | End: 2018-07-19
Attending: FAMILY MEDICINE
Payer: COMMERCIAL

## 2018-07-19 ENCOUNTER — HOSPITAL ENCOUNTER (OUTPATIENT)
Dept: MAMMOGRAPHY | Age: 48
Discharge: HOME | End: 2018-07-19
Attending: FAMILY MEDICINE
Payer: COMMERCIAL

## 2018-07-19 ENCOUNTER — TELEPHONE (OUTPATIENT)
Dept: FAMILY MEDICINE CLINIC | Facility: CLINIC | Age: 48
End: 2018-07-19

## 2018-07-19 DIAGNOSIS — R92.2 INCONCLUSIVE MAMMOGRAM: ICD-10-CM

## 2018-07-19 PROCEDURE — 77061 BREAST TOMOSYNTHESIS UNI: CPT | Performed by: FAMILY MEDICINE

## 2018-07-19 PROCEDURE — 77065 DX MAMMO INCL CAD UNI: CPT | Performed by: FAMILY MEDICINE

## 2018-07-19 PROCEDURE — 76642 ULTRASOUND BREAST LIMITED: CPT | Performed by: FAMILY MEDICINE

## 2018-07-19 NOTE — TELEPHONE ENCOUNTER
Called patient back to inquire about questions she has and she stated she wanted appt to discuss result with LE but she was told LE out she then hung up the phone on me.

## 2018-07-19 NOTE — TELEPHONE ENCOUNTER
Had mammogram today and one earlier this week. Patient needs clarification on results. Please advise.

## 2018-07-23 ENCOUNTER — OFFICE VISIT (OUTPATIENT)
Dept: PHYSICAL THERAPY | Age: 48
End: 2018-07-23
Attending: FAMILY MEDICINE
Payer: COMMERCIAL

## 2018-07-23 DIAGNOSIS — G89.29 CHRONIC PAIN OF BOTH KNEES: ICD-10-CM

## 2018-07-23 DIAGNOSIS — M25.562 CHRONIC PAIN OF BOTH KNEES: ICD-10-CM

## 2018-07-23 DIAGNOSIS — M25.561 CHRONIC PAIN OF BOTH KNEES: ICD-10-CM

## 2018-07-23 PROCEDURE — 97110 THERAPEUTIC EXERCISES: CPT

## 2018-07-23 PROCEDURE — 97162 PT EVAL MOD COMPLEX 30 MIN: CPT

## 2018-07-23 NOTE — PROGRESS NOTES
INITIAL EVALUATION:   Referring Physician: Dr. Eleni Claude  Diagnosis: Chronic pain of both knees (M25.561,M25.562,G89.29)       Date of Service: 7/23/2018     PATIENT Jeremy Jimenez is a 52year old y/o female who presents to therapy with patellofemoral mobility    Flexibility: Minimal limitations in bilateral hamstrings    Strength/MMT: Hip flexors 5/5, Hip abductors 4/5, Quadriceps 5/5, Hamstrings 5/5, Ankle dorsiflexors 5/5    Special tests: SLR negative, Varus/valgus stress tests n 7/23/2018  To:10/21/2018

## 2018-07-25 ENCOUNTER — OFFICE VISIT (OUTPATIENT)
Dept: NEUROLOGY | Facility: CLINIC | Age: 48
End: 2018-07-25

## 2018-07-25 ENCOUNTER — OFFICE VISIT (OUTPATIENT)
Dept: PHYSICAL THERAPY | Age: 48
End: 2018-07-25
Attending: FAMILY MEDICINE
Payer: COMMERCIAL

## 2018-07-25 VITALS — HEART RATE: 74 BPM | DIASTOLIC BLOOD PRESSURE: 68 MMHG | SYSTOLIC BLOOD PRESSURE: 124 MMHG

## 2018-07-25 DIAGNOSIS — M25.561 CHRONIC PAIN OF BOTH KNEES: ICD-10-CM

## 2018-07-25 DIAGNOSIS — M25.562 CHRONIC PAIN OF BOTH KNEES: ICD-10-CM

## 2018-07-25 DIAGNOSIS — G35 MULTIPLE SCLEROSIS (HCC): Primary | ICD-10-CM

## 2018-07-25 DIAGNOSIS — G89.29 CHRONIC PAIN OF BOTH KNEES: ICD-10-CM

## 2018-07-25 PROCEDURE — 97110 THERAPEUTIC EXERCISES: CPT

## 2018-07-25 PROCEDURE — 99213 OFFICE O/P EST LOW 20 MIN: CPT | Performed by: OTHER

## 2018-07-25 NOTE — PATIENT INSTRUCTIONS
Refill policies:    • Allow 2-3 business days for refills; controlled substances may take longer.   • Contact your pharmacy at least 5 days prior to running out of medication and have them send an electronic request or submit request through the “request re entire amount billed. Precertification and Prior Authorizations: If your physician has recommended that you have a procedure or additional testing performed.   [facility]) will contact your insurance carrier to obtain pre-certi

## 2018-07-25 NOTE — PROGRESS NOTES
HPI:    Patient ID: Dewayne Baron is a 52year old female. HPI      Radha Mehta is a pleasant 52year old female who presented for follow up for Multiple Sclerosis. States she feels fine overall. Complaints of intermittent fatigue and memory problem.   Josiah Dodd demyelination.                 HISTORY:  Past Medical History:   Diagnosis Date   • Fibroids    • HSV (herpes simplex virus) anogenital infection    • [facility]      Past Surgical History:  1/5/2017: EGD N/A      Comment: Procedure: [Sulfametho*    SWELLING, SHORTNESS OF BREATH   PHYSICAL EXAM:   Physical Exam    Blood pressure 124/68, pulse 74. Vitals reviewed  General: well developed, well nourished  HEENT:  Normocephalic and atraumatic.  Moist mucus membrane  Cardiovascular: Norm Referrals:  None       #[id number]

## 2018-07-25 NOTE — PROGRESS NOTES
Pt is here for follow up for MS. Pt states she stared PT. Pt states she is still the same since the last time emmett saw her in the office.

## 2018-07-25 NOTE — PROGRESS NOTES
Dx: Chronic pain of both knees (M25.561,M25.562,G89.29)       Authorized # of Visits:  [id number]  Fall Risk: standard         Precautions: n/a             Subjective:    The patient reports her knee has been feeling ok today  Objective:   See flow sheet    Assessme

## 2018-07-30 ENCOUNTER — OFFICE VISIT (OUTPATIENT)
Dept: PHYSICAL THERAPY | Age: 48
End: 2018-07-30
Attending: FAMILY MEDICINE
Payer: COMMERCIAL

## 2018-07-30 DIAGNOSIS — G89.29 CHRONIC PAIN OF BOTH KNEES: ICD-10-CM

## 2018-07-30 DIAGNOSIS — M25.562 CHRONIC PAIN OF BOTH KNEES: ICD-10-CM

## 2018-07-30 DIAGNOSIS — M25.561 CHRONIC PAIN OF BOTH KNEES: ICD-10-CM

## 2018-07-30 PROCEDURE — 97110 THERAPEUTIC EXERCISES: CPT

## 2018-08-01 ENCOUNTER — APPOINTMENT (OUTPATIENT)
Dept: PHYSICAL THERAPY | Age: 48
End: 2018-08-01
Attending: FAMILY MEDICINE
Payer: COMMERCIAL

## 2018-08-06 ENCOUNTER — APPOINTMENT (OUTPATIENT)
Dept: PHYSICAL THERAPY | Age: 48
End: 2018-08-06
Attending: FAMILY MEDICINE
Payer: COMMERCIAL

## 2018-08-07 ENCOUNTER — TELEPHONE (OUTPATIENT)
Dept: NEUROLOGY | Facility: CLINIC | Age: 48
End: 2018-08-07

## 2018-08-07 DIAGNOSIS — G35 MS (MULTIPLE SCLEROSIS) (HCC): Primary | ICD-10-CM

## 2018-08-07 NOTE — TELEPHONE ENCOUNTER
Per 7/25/18 OV:  She expresses interest in Aubagio or Avonex but would like to think about it and give me call.

## 2018-08-07 NOTE — TELEPHONE ENCOUNTER
Patient states she would rather take a pill vs shots but when she took [place] \"I felt like someone beat me up\" and it caused difficulty with ambulation.  States she took Avonex \"for years\" but she is tired of taking shots though if necessary she will d

## 2018-08-08 ENCOUNTER — APPOINTMENT (OUTPATIENT)
Dept: PHYSICAL THERAPY | Age: 48
End: 2018-08-08
Attending: FAMILY MEDICINE
Payer: COMMERCIAL

## 2018-08-09 NOTE — TELEPHONE ENCOUNTER
Per Dr Saad Pickard: We can try a lower dose of Aubagio.  All medications will have some side effects (Routing comment)

## 2018-08-13 ENCOUNTER — APPOINTMENT (OUTPATIENT)
Dept: PHYSICAL THERAPY | Age: 48
End: 2018-08-13
Attending: FAMILY MEDICINE
Payer: COMMERCIAL

## 2018-08-13 NOTE — TELEPHONE ENCOUNTER
Per Dr Cindy Armstrong: Patient can try Aubagio 7 mg     Patient informed of above. New start form need be signed and completed by patient for Aubagio 7 mg     Patient also asking if Dr Cindy Armstrong can sign her FMLA forms.  Advised patient to drop off forms or fax th

## 2018-08-15 ENCOUNTER — APPOINTMENT (OUTPATIENT)
Dept: PHYSICAL THERAPY | Age: 48
End: 2018-08-15
Attending: FAMILY MEDICINE
Payer: COMMERCIAL

## 2018-08-23 NOTE — TELEPHONE ENCOUNTER
Rec'd incoming fax from [address] [place] [address] dated 8/17/18 stating that no PA is required for this medication.     Reference #: [id number]    Completed Start Form faxed to [employer]estine [employer], along with facesheet, copy of insurance card, and Jose L Sevilla

## 2018-09-07 ENCOUNTER — TELEPHONE (OUTPATIENT)
Dept: NEUROLOGY | Facility: CLINIC | Age: 48
End: 2018-09-07

## 2018-09-20 NOTE — TELEPHONE ENCOUNTER
[facility] paperwork signed by Dr Castellano July. Per reason for call patient picking up paperwork 9/21/18. Paperwork placed in  folder for .      Copy sent to scan and in scanning folder

## 2018-09-27 ENCOUNTER — APPOINTMENT (OUTPATIENT)
Dept: GENERAL RADIOLOGY | Facility: HOSPITAL | Age: 48
End: 2018-09-27
Attending: EMERGENCY MEDICINE
Payer: COMMERCIAL

## 2018-09-27 ENCOUNTER — TELEPHONE (OUTPATIENT)
Dept: NEUROLOGY | Facility: CLINIC | Age: 48
End: 2018-09-27

## 2018-09-27 ENCOUNTER — HOSPITAL ENCOUNTER (EMERGENCY)
Facility: HOSPITAL | Age: 48
Discharge: HOME | End: 2018-09-27
Attending: EMERGENCY MEDICINE
Payer: COMMERCIAL

## 2018-09-27 VITALS
BODY MASS INDEX: 26.5 KG/M2 | HEIGHT: 60 IN | HEART RATE: 68 BPM | RESPIRATION RATE: 18 BRPM | TEMPERATURE: 98 F | OXYGEN SATURATION: 100 % | SYSTOLIC BLOOD PRESSURE: 125 MMHG | DIASTOLIC BLOOD PRESSURE: 82 MMHG | WEIGHT: 135 LBS

## 2018-09-27 DIAGNOSIS — K59.00 CONSTIPATION, UNSPECIFIED CONSTIPATION TYPE: Primary | ICD-10-CM

## 2018-09-27 PROCEDURE — 81003 URINALYSIS AUTO W/O SCOPE: CPT | Performed by: EMERGENCY MEDICINE

## 2018-09-27 PROCEDURE — 99284 EMERGENCY DEPT VISIT MOD MDM: CPT

## 2018-09-27 PROCEDURE — 99283 EMERGENCY DEPT VISIT LOW MDM: CPT

## 2018-09-27 PROCEDURE — 74018 RADEX ABDOMEN 1 VIEW: CPT | Performed by: EMERGENCY MEDICINE

## 2018-09-27 NOTE — ED PROVIDER NOTES
Patient Seen in: [facility] Emergency Department    History   Patient presents with:  Back Pain (musculoskeletal)    Stated Complaint:     HPI    44-year-old female who presents to the emergency department complaint of having constipation for the pa 98 °F (36.7 °C)   Temp src Temporal   SpO2 100 %   O2 Device None (Room air)       Current:BP (!) 125/92   Pulse 66   Temp 98 °F (36.7 °C) (Temporal)   Resp 18   Ht 152.4 cm (5')   Wt 61.2 kg   SpO2 100%   BMI 26.37 kg/m²         Physical Exam  General: Th colon.  This is a mostly in the right colon, and transverse colon.  Minimal stool left colon, no rectal fecal impaction.  Mild gas in the GI tract, no sign of obstruction.  No evidence for free air.     Dictated by: Iwona Ron MD on 9/27/2018 at 8:18 possible for a visit in 2 days          Medications Prescribed:  Current Discharge Medication List

## 2018-09-27 NOTE — TELEPHONE ENCOUNTER
Noted patient was seen in the [address] ED today 9/27/18.      Per ED notes- Patient presents with: Back Pain, musculoskeletal. HPI : 27-year-old female who presents to the emergency department complaint of having constipation for the past 3 days    Noted ander

## 2018-09-28 ENCOUNTER — OFFICE VISIT (OUTPATIENT)
Dept: FAMILY MEDICINE CLINIC | Facility: CLINIC | Age: 48
End: 2018-09-28

## 2018-09-28 VITALS
RESPIRATION RATE: 16 BRPM | BODY MASS INDEX: 27.68 KG/M2 | TEMPERATURE: 99 F | SYSTOLIC BLOOD PRESSURE: 122 MMHG | HEART RATE: 80 BPM | WEIGHT: 141 LBS | DIASTOLIC BLOOD PRESSURE: 72 MMHG | HEIGHT: 60 IN

## 2018-09-28 DIAGNOSIS — G35 MS (MULTIPLE SCLEROSIS) (HCC): ICD-10-CM

## 2018-09-28 DIAGNOSIS — M54.89 OTHER BACK PAIN, UNSPECIFIED CHRONICITY: ICD-10-CM

## 2018-09-28 DIAGNOSIS — K59.09 OTHER CONSTIPATION: Primary | ICD-10-CM

## 2018-09-28 DIAGNOSIS — B00.9 HSV INFECTION: ICD-10-CM

## 2018-09-28 LAB
APPEARANCE: CLEAR
MULTISTIX LOT#: ABNORMAL NUMERIC
PH, URINE: 8.5 (ref 4.5–8)
SPECIFIC GRAVITY: 1.01 (ref 1–1.03)
URINE-COLOR: YELLOW
UROBILINOGEN,SEMI-QN: 0.2 MG/DL (ref 0–1.9)

## 2018-09-28 PROCEDURE — 87086 URINE CULTURE/COLONY COUNT: CPT | Performed by: FAMILY MEDICINE

## 2018-09-28 PROCEDURE — 99214 OFFICE O/P EST MOD 30 MIN: CPT | Performed by: FAMILY MEDICINE

## 2018-09-28 PROCEDURE — 81003 URINALYSIS AUTO W/O SCOPE: CPT | Performed by: FAMILY MEDICINE

## 2018-09-28 RX ORDER — VALACYCLOVIR HYDROCHLORIDE 500 MG/1
500 TABLET, FILM COATED ORAL 2 TIMES DAILY
Qty: 6 TABLET | Refills: 1 | Status: SHIPPED | OUTPATIENT
Start: 2018-09-28 | End: 2019-07-09

## 2018-09-28 RX ORDER — LACTULOSE 10 G/15ML
30 SOLUTION ORAL 2 TIMES DAILY
Qty: 240 ML | Refills: 0 | Status: SHIPPED | OUTPATIENT
Start: 2018-09-28 | End: 2018-10-02

## 2018-09-28 NOTE — PROGRESS NOTES
HPI:   Adelaide Shepard is a 50year old female here to follow up on MS and constipation .       Pt was diagnosed with MS in the 90's  MRI 2018 ; pt did reveal demyelinating plaques   Pt was put on Aubagio in early September   Pt reports constipation has b BSO   Family History   Problem Relation Age of Onset   • Diabetes Mother    • Hypertension Mother    • Breast Cancer Mother 64      Social History:   Social History    Tobacco Use      Smoking status: Never Smoker      Smokeless tobacco: Never Used    Alco infection    - ValACYclovir HCl 500 MG Oral Tab; Take 1 tablet (500 mg total) by mouth 2 (two) times daily. Dispense: 6 tablet; Refill: 1    3.  Other back pain, unspecified chronicity    - URINALYSIS, AUTO, W/O SCOPE  - URINE CULTURE, ROUTINE; Future  - U

## 2018-09-29 ENCOUNTER — TELEPHONE (OUTPATIENT)
Dept: FAMILY MEDICINE CLINIC | Facility: CLINIC | Age: 48
End: 2018-09-29

## 2018-09-29 NOTE — TELEPHONE ENCOUNTER
helen sent a fax regarding the med that Clarene Koyanagi gave her yesterday at her visit for constipation. They need the SIG on it. Patient waiting for this to get med so she can start it this weekend.     Pls call her back after you call the pharmacy to lady

## 2018-10-12 NOTE — TELEPHONE ENCOUNTER
Per Dr Urbina Abts: Constipation not usually seen. Diarrhea is a common side effect. (Routing comment)     Left a detailed message on patient's voicemail (ok per HIPPA) informing above.  Encouraged to call office back with any questions or concerns

## 2018-10-22 ENCOUNTER — TELEPHONE (OUTPATIENT)
Dept: NEUROLOGY | Facility: CLINIC | Age: 48
End: 2018-10-22

## 2018-11-02 ENCOUNTER — TELEPHONE (OUTPATIENT)
Dept: FAMILY MEDICINE CLINIC | Facility: CLINIC | Age: 48
End: 2018-11-02

## 2018-11-02 ENCOUNTER — TELEPHONE (OUTPATIENT)
Dept: NEUROLOGY | Facility: CLINIC | Age: 48
End: 2018-11-02

## 2018-11-02 NOTE — TELEPHONE ENCOUNTER
Spent 10 minutes asking pt about her symptoms, many answers were \"I cannot remember\" or \"not so much\" after a lengthy pause for pt to search for her answer.   Pt states she is having chest pain, unable to say for how long or how long the episodes last.

## 2018-11-02 NOTE — TELEPHONE ENCOUNTER
Per LE: ER. Follow up after ER with OV. Informed pt, pt expressed understanding and agreement. Task completed.

## 2018-12-26 ENCOUNTER — OFFICE VISIT (OUTPATIENT)
Dept: FAMILY MEDICINE CLINIC | Facility: CLINIC | Age: 48
End: 2018-12-26

## 2018-12-26 VITALS
SYSTOLIC BLOOD PRESSURE: 124 MMHG | OXYGEN SATURATION: 95 % | HEART RATE: 79 BPM | TEMPERATURE: 98 F | RESPIRATION RATE: 16 BRPM | DIASTOLIC BLOOD PRESSURE: 84 MMHG

## 2018-12-26 DIAGNOSIS — N64.4 BILATERAL MASTODYNIA: Primary | ICD-10-CM

## 2018-12-26 DIAGNOSIS — Z80.3 FAMILY HISTORY OF BREAST CANCER IN MOTHER: ICD-10-CM

## 2018-12-26 DIAGNOSIS — N89.8 VAGINAL IRRITATION: ICD-10-CM

## 2018-12-26 PROCEDURE — 81003 URINALYSIS AUTO W/O SCOPE: CPT | Performed by: FAMILY MEDICINE

## 2018-12-26 PROCEDURE — 99214 OFFICE O/P EST MOD 30 MIN: CPT | Performed by: FAMILY MEDICINE

## 2018-12-26 PROCEDURE — 87660 TRICHOMONAS VAGIN DIR PROBE: CPT | Performed by: FAMILY MEDICINE

## 2018-12-26 PROCEDURE — 87510 GARDNER VAG DNA DIR PROBE: CPT | Performed by: FAMILY MEDICINE

## 2018-12-26 PROCEDURE — 87086 URINE CULTURE/COLONY COUNT: CPT | Performed by: FAMILY MEDICINE

## 2018-12-26 PROCEDURE — 87480 CANDIDA DNA DIR PROBE: CPT | Performed by: FAMILY MEDICINE

## 2018-12-26 RX ORDER — FLUCONAZOLE 150 MG/1
TABLET ORAL
Refills: 0 | COMMUNITY
Start: 2018-12-20 | End: 2018-12-27 | Stop reason: ALTCHOICE

## 2018-12-26 RX ORDER — FLUTICASONE PROPIONATE 50 MCG
SPRAY, SUSPENSION (ML) NASAL
Refills: 0 | COMMUNITY
Start: 2018-12-12 | End: 2021-04-01

## 2018-12-26 RX ORDER — AMOXICILLIN AND CLAVULANATE POTASSIUM 875; 125 MG/1; MG/1
TABLET, FILM COATED ORAL
Refills: 0 | COMMUNITY
Start: 2018-12-12 | End: 2019-04-02 | Stop reason: ALTCHOICE

## 2018-12-26 NOTE — PROGRESS NOTES
HPI:   Alfred Combs is a 50year old female here to discuss breast tenderness and vaginal concerns     Pt c/o bilateral breast pain   Mammogram done in July   + cysts on ultrasound of left breast   Pt does not drink caffeine   Mom had breast cancer 11/2012    Roshan/Eri URBINA      Family History   Problem Relation Age of Onset   • Diabetes Mother    • Hypertension Mother    • Breast Cancer Mother 64      Social History:   Social History    Tobacco Use      Smoking status: Never Smoker      Smokeless t female here with     1. Bilateral mastodynia    - MARQUIS ANAIS 2D+3D DIAGNOSTIC MARQUIS  BILAT (CPT=77066/62903); Future  - SURGERY - INTERNAL    2. Family history of breast cancer in mother    - MARQUIS ANAIS 2D+3D DIAGNOSTIC MARQUIS  BILAT (CPT=77066/69454);  Future  - [facility]

## 2018-12-27 RX ORDER — METRONIDAZOLE 500 MG/1
500 TABLET ORAL 2 TIMES DAILY
Qty: 14 TABLET | Refills: 0 | Status: SHIPPED | OUTPATIENT
Start: 2018-12-27 | End: 2019-04-02 | Stop reason: ALTCHOICE

## 2018-12-27 RX ORDER — METRONIDAZOLE 500 MG/1
500 TABLET ORAL 2 TIMES DAILY
Qty: 14 TABLET | Refills: 0 | Status: SHIPPED | OUTPATIENT
Start: 2018-12-27 | End: 2018-12-27 | Stop reason: CLARIF

## 2018-12-27 RX ORDER — FLUCONAZOLE 150 MG/1
150 TABLET ORAL DAILY
Qty: 1 TABLET | Refills: 1 | Status: SHIPPED | OUTPATIENT
Start: 2018-12-27 | End: 2019-07-10 | Stop reason: ALTCHOICE

## 2019-01-02 ENCOUNTER — TELEPHONE (OUTPATIENT)
Dept: NEUROLOGY | Facility: CLINIC | Age: 49
End: 2019-01-02

## 2019-01-02 ENCOUNTER — OFFICE VISIT (OUTPATIENT)
Dept: NEUROLOGY | Facility: CLINIC | Age: 49
End: 2019-01-02

## 2019-01-02 VITALS
HEART RATE: 80 BPM | WEIGHT: 140 LBS | SYSTOLIC BLOOD PRESSURE: 110 MMHG | DIASTOLIC BLOOD PRESSURE: 80 MMHG | HEIGHT: 60 IN | BODY MASS INDEX: 27.48 KG/M2

## 2019-01-02 DIAGNOSIS — G35 MS (MULTIPLE SCLEROSIS) (HCC): Primary | ICD-10-CM

## 2019-01-02 PROCEDURE — 99213 OFFICE O/P EST LOW 20 MIN: CPT | Performed by: OTHER

## 2019-01-02 NOTE — TELEPHONE ENCOUNTER
Per provider, patient to start Avonex. Avonex start form completed and signed by patient, also signed by provider. Start form faxed to [employer], confirmation received. PA started via CoverMyMeds.  Key: [id number]

## 2019-01-02 NOTE — PATIENT INSTRUCTIONS
Refill policies:    • Allow 2-3 business days for refills; controlled substances may take longer.   • Contact your pharmacy at least 5 days prior to running out of medication and have them send an electronic request or submit request through the “request re entire amount billed. Precertification and Prior Authorizations: If your physician has recommended that you have a procedure or additional testing performed.   [facility]) will contact your insurance carrier to obtain pre-certi

## 2019-01-02 NOTE — PROGRESS NOTES
Pt states she is here for follow up,   States medication she was most recently prescribed was discontinued approximately 2 weeks after being prescribed. She would like to go back to original medication prescribed.     Pain in the mornings

## 2019-01-02 NOTE — PROGRESS NOTES
HPI:    Patient ID: Feliciano Fenton is a 50year old female. Multiple Sclerosis       Caleb Ferreira is a 50year old female who presented for follow up for Multiple Sclerosis. She feels fine overall.  States she stopped Aubagio after 2 weeks, she was not fee Clavulanate 875-125 MG Oral Tab TK 1 T PO  BID Disp:  Rfl: 0   CALCIUM-VITAMIN D  mg daily. Disp:  Rfl:    Nutritional Supplements (ADULT NUTRITIONAL SUPPLEMENT OR) Take by mouth.  Black seed oil 1 teaspoon twice a day Disp:  Rfl:    Multiple Vitamins alternating movements. Gait: Normal based.  Mild difficulty with tandem walk ( states it is due to the pain right knee )         ASSESSMENT/PLAN:   Ms (multiple sclerosis) ([facility])  (primary encounter diagnosis)    She remains clinical stable  MRI brain from

## 2019-01-03 NOTE — TELEPHONE ENCOUNTER
Received a fax from [address] [place] [address],[address] stating Avonex Pen 30mcg/0.5ml auto-injector kit does not require authorization. [address] who states they were   calling to confirm a few things for patients Avonex order.

## 2019-01-07 ENCOUNTER — HOSPITAL ENCOUNTER (OUTPATIENT)
Dept: MAMMOGRAPHY | Age: 49
Discharge: HOME | End: 2019-01-07
Attending: FAMILY MEDICINE
Payer: COMMERCIAL

## 2019-01-07 DIAGNOSIS — Z80.3 FAMILY HISTORY OF BREAST CANCER IN MOTHER: ICD-10-CM

## 2019-01-07 DIAGNOSIS — N64.4 BILATERAL MASTODYNIA: ICD-10-CM

## 2019-01-07 PROCEDURE — 77066 DX MAMMO INCL CAD BI: CPT | Performed by: FAMILY MEDICINE

## 2019-01-07 PROCEDURE — 77062 BREAST TOMOSYNTHESIS BI: CPT | Performed by: FAMILY MEDICINE

## 2019-01-08 ENCOUNTER — TELEPHONE (OUTPATIENT)
Dept: FAMILY MEDICINE CLINIC | Facility: CLINIC | Age: 49
End: 2019-01-08

## 2019-01-15 ENCOUNTER — TELEPHONE (OUTPATIENT)
Dept: NEUROLOGY | Facility: CLINIC | Age: 49
End: 2019-01-15

## 2019-01-15 ENCOUNTER — HOSPITAL ENCOUNTER (EMERGENCY)
Age: 49
Discharge: HOME | End: 2019-01-15
Attending: EMERGENCY MEDICINE
Payer: COMMERCIAL

## 2019-01-15 VITALS
TEMPERATURE: 99 F | BODY MASS INDEX: 27.09 KG/M2 | SYSTOLIC BLOOD PRESSURE: 91 MMHG | OXYGEN SATURATION: 99 % | RESPIRATION RATE: 16 BRPM | WEIGHT: 138 LBS | HEIGHT: 60 IN | DIASTOLIC BLOOD PRESSURE: 66 MMHG | HEART RATE: 89 BPM

## 2019-01-15 DIAGNOSIS — J06.9 VIRAL UPPER RESPIRATORY INFECTION: Primary | ICD-10-CM

## 2019-01-15 LAB
BILIRUB UR QL STRIP.AUTO: NEGATIVE
CLARITY UR REFRACT.AUTO: CLEAR
COLOR UR AUTO: YELLOW
GLUCOSE UR STRIP.AUTO-MCNC: NEGATIVE MG/DL
KETONES UR STRIP.AUTO-MCNC: 40 MG/DL
LEUKOCYTE ESTERASE UR QL STRIP.AUTO: NEGATIVE
NITRITE UR QL STRIP.AUTO: NEGATIVE
PH UR STRIP.AUTO: 6 [PH] (ref 4.5–8)
PROT UR STRIP.AUTO-MCNC: NEGATIVE MG/DL
RBC UR QL AUTO: NEGATIVE
SP GR UR STRIP.AUTO: 1.01 (ref 1–1.03)
UROBILINOGEN UR STRIP.AUTO-MCNC: 0.2 MG/DL

## 2019-01-15 PROCEDURE — 81003 URINALYSIS AUTO W/O SCOPE: CPT

## 2019-01-15 PROCEDURE — 99283 EMERGENCY DEPT VISIT LOW MDM: CPT

## 2019-01-15 PROCEDURE — 81025 URINE PREGNANCY TEST: CPT

## 2019-01-15 PROCEDURE — 81003 URINALYSIS AUTO W/O SCOPE: CPT | Performed by: EMERGENCY MEDICINE

## 2019-01-15 NOTE — TELEPHONE ENCOUNTER
Spoke with patient, who states if we receive any kind of paperwork or request for any kind of pain medication that Dr. Abby Santacruz has to approve and sign she would like Dr. Abby Santacruz to deny it. Routed to provider as an Rich Regulus.

## 2019-01-16 ENCOUNTER — TELEPHONE (OUTPATIENT)
Dept: NEUROLOGY | Facility: CLINIC | Age: 49
End: 2019-01-16

## 2019-01-16 NOTE — TELEPHONE ENCOUNTER
Spoke with patient who states she will be getting her Avonex titration pack delivered to her today or tomorrow.  She would like to come into the office on Friday 1.18.1, in the afternoon for some nurse education on injecting the medication and also possibly

## 2019-01-17 NOTE — ED PROVIDER NOTES
Patient Seen in: THE [facility] Emergency Department In [place]    History   Patient presents with:  Urinary Symptoms (urologic)  Constipation (gastrointestinal)    Stated Complaint: urin s/s, constipation    HPI    Pleasant 42-year-old woman presents to the Temp 99.1 °F (37.3 °C) (Temporal)   Resp 16   Ht 152.4 cm (5')   Wt 62.6 kg   SpO2 99%   BMI 26.95 kg/m²         Physical Exam    Pleasant mildly anxious well-developed well-nourished woman sitting on her emergency department bed she is in no acute distres

## 2019-01-18 ENCOUNTER — TELEPHONE (OUTPATIENT)
Dept: FAMILY MEDICINE CLINIC | Facility: CLINIC | Age: 49
End: 2019-01-18

## 2019-01-18 DIAGNOSIS — G35 MS (MULTIPLE SCLEROSIS) (HCC): Primary | ICD-10-CM

## 2019-01-18 NOTE — TELEPHONE ENCOUNTER
Pt requesting a referral for her neurologist Dr. Johan Leary, pt ran out of visits on the last referral. Please call and advise.

## 2019-01-25 ENCOUNTER — NURSE ONLY (OUTPATIENT)
Dept: NEUROLOGY | Facility: CLINIC | Age: 49
End: 2019-01-25

## 2019-01-25 ENCOUNTER — TELEPHONE (OUTPATIENT)
Dept: NEUROLOGY | Facility: CLINIC | Age: 49
End: 2019-01-25

## 2019-01-25 NOTE — TELEPHONE ENCOUNTER
Rec'd incoming fax request from pharmacy to refill various diabetic supplies and a psoriasis topical cream.  None of these are prescribed by Dr. Abby Santacruz. Faxed request back to pharmacy indicating this.

## 2019-01-25 NOTE — PROGRESS NOTES
Patient instructed on proper injection technique. Patient brought room temp Avonex syringe and week 1 startgrip. Patient did not feel comfortable giving own injection. Avonex injection given per RN left outer thigh. No bleeding noted at site.

## 2019-02-04 ENCOUNTER — TELEPHONE (OUTPATIENT)
Dept: NEUROLOGY | Facility: CLINIC | Age: 49
End: 2019-02-04

## 2019-02-04 RX ORDER — FLUTICASONE PROPIONATE 0.05 %
CREAM (GRAM) TOPICAL
Qty: 15 G | Refills: 0 | Status: SHIPPED | OUTPATIENT
Start: 2019-02-04 | End: 2019-07-10 | Stop reason: ALTCHOICE

## 2019-02-04 NOTE — TELEPHONE ENCOUNTER
She can try oral benadryl. I can prescribe fluticasone cream but hives are worsening discontinue Avonex.

## 2019-02-04 NOTE — TELEPHONE ENCOUNTER
Called patient back to relay Dr. Serafin Perez recommendations to try oral benadryl, to stop Avonex and that Rx for fluticasone cream has been sent to patient's preferred pharmacy.     Patient states she does not get any relief from oral benadryl and she wants

## 2019-02-05 ENCOUNTER — TELEPHONE (OUTPATIENT)
Dept: NEUROLOGY | Facility: CLINIC | Age: 49
End: 2019-02-05

## 2019-02-05 NOTE — TELEPHONE ENCOUNTER
Called patient back today and discussed reaction to Avonex. Informed patient thtat after discussion with Dr. Castellano July, patient should stop taking Avonex due to patient reaction.  Patient VU, verbalizes intent to comply and denies any further questions at th

## 2019-02-05 NOTE — TELEPHONE ENCOUNTER
Received fax from [employer] reporting adverse reaction of patient from [employer]. Completed paperwork, signed by provider and faxed to [employer]. Fax confirmation received. Copy sent to scanning.

## 2019-02-18 ENCOUNTER — OFFICE VISIT (OUTPATIENT)
Dept: NEUROLOGY | Facility: CLINIC | Age: 49
End: 2019-02-18

## 2019-02-18 VITALS
DIASTOLIC BLOOD PRESSURE: 64 MMHG | HEART RATE: 68 BPM | BODY MASS INDEX: 27 KG/M2 | RESPIRATION RATE: 15 BRPM | WEIGHT: 140 LBS | SYSTOLIC BLOOD PRESSURE: 114 MMHG

## 2019-02-18 DIAGNOSIS — G35 MS (MULTIPLE SCLEROSIS) (HCC): Primary | ICD-10-CM

## 2019-02-18 PROCEDURE — 99213 OFFICE O/P EST LOW 20 MIN: CPT | Performed by: OTHER

## 2019-02-18 RX ORDER — INTERFERON BETA-1A 30MCG/.5ML
KIT INTRAMUSCULAR
Refills: 0 | COMMUNITY
Start: 2019-01-16 | End: 2019-12-14

## 2019-02-18 NOTE — PROGRESS NOTES
HPI:    Patient ID: Erick Joya is a 50year old female. Multiple Sclerosis         Barrett Joy is a 50year old female who presented with complaints of possible allergy to Avonex.  Patient recently started for follow up for Multiple Sclerosis and appa MCG Intramuscular Kit Inject 30 mcg into the muscle every 7 days. Disp: 1 kit Rfl: 11   metRONIDAZOLE (FLAGYL) 500 MG Oral Tab Take 1 tablet (500 mg total) by mouth 2 (two) times daily.  Avoid alcohol while taking medication Disp: 14 tablet Rfl: 0   flucona extremities. Strength is 5/5 in all muscle groups  Reflexes: symmetric and brisk throughout  Coordination: Intact finger to nose and heel to shin test. Normal rapid alternating movements. Gait: Normal based.  Mild difficulty with tandem walk ( states it is

## 2019-03-01 ENCOUNTER — TELEPHONE (OUTPATIENT)
Dept: SURGERY | Facility: CLINIC | Age: 49
End: 2019-03-01

## 2019-03-01 DIAGNOSIS — G35 MS (MULTIPLE SCLEROSIS) (HCC): Primary | ICD-10-CM

## 2019-03-01 NOTE — TELEPHONE ENCOUNTER
Per 2/18/19 OV note:    Ms (multiple sclerosis) ([facility])  (primary encounter diagnosis)     No clear allergic reaction to Avonex. Accidentally ate tomatoes and has known allergy to tomatoes  She would like resume Avonex.  She may have to call the drug company

## 2019-03-07 NOTE — TELEPHONE ENCOUNTER
Received fax from [facility] stating \" Dimas Goel is unable to provide a recommendation specifically on dosing in cases of reinitiating Avonex treatment following itching and welts. \"

## 2019-03-08 NOTE — TELEPHONE ENCOUNTER
Left message on patient identified voicemail (ok with HIPPA consent) asking for patient call back with what specialty pharmacy Avonex RX needs to go to.

## 2019-03-11 NOTE — TELEPHONE ENCOUNTER
Spoke with Avonex rep, Sheldon Valenzuela, who will order the StartGrip so patient can start from the 1/2 dose that she left off at.  RX pended for Dr Fernandez Comfort approval. Left message on patient identified voicemail (ok with HIPPA consent) informing patient that she

## 2019-03-27 NOTE — TELEPHONE ENCOUNTER
Per [facility] records, Avonex Rx was electronically sent to [address] [place] [address] on 3/12/19. Pt calling this morning asking for status on Rx. Contacted pharmacy and spoke with Michael.   She reviewed med profile and consulted with juliana

## 2019-04-01 NOTE — TELEPHONE ENCOUNTER
Spoke with Arnav King,  with specialty pharmacy. He said Rx was still in \"pharmacy\" stage to be processed. Informed him that this should have been taken care of as of 3/27/19 documentation below with [employer].  States they had her Rx on file

## 2019-04-01 NOTE — PROGRESS NOTES
HPI:   Samara Jensen is a 50year old female here to follow up on MS and constipation .       Pt was diagnosed with MS in the 90's  MRI 2018 ; pt did reveal demyelinating plaques   Pt did not go on meds as recommended      Pt is doing her best with fibe 4/28/2017      Past Surgical History:   Procedure Laterality Date   • ESOPHAGOGASTRODUODENOSCOPY, COLONOSCOPY, POSSIBLE BIOPSY, POSSIBLE POLYPECTOMY 37562, 96507 N/A 1/5/2017    Performed by Brenda Blank MD at [address] [place] [address] are intact,motor and sensory are grossly intact    ASSESSMENT AND PLAN:   Guera Melo is a 50year old female here with   1. Chronic constipation    - XR ABDOMEN (1 VIEW) (CPT=74018); Future  - CBC WITH DIFFERENTIAL WITH PLATELET;  Future  - COMP META

## 2019-04-01 NOTE — TELEPHONE ENCOUNTER
Patient called [facility] to check status of medication and she was told that they do not have a script for her

## 2019-04-02 ENCOUNTER — HOSPITAL ENCOUNTER (OUTPATIENT)
Dept: GENERAL RADIOLOGY | Age: 49
Discharge: HOME | End: 2019-04-02
Attending: FAMILY MEDICINE
Payer: COMMERCIAL

## 2019-04-02 ENCOUNTER — LAB ENCOUNTER (OUTPATIENT)
Dept: LAB | Age: 49
End: 2019-04-02
Attending: FAMILY MEDICINE
Payer: COMMERCIAL

## 2019-04-02 ENCOUNTER — OFFICE VISIT (OUTPATIENT)
Dept: FAMILY MEDICINE CLINIC | Facility: CLINIC | Age: 49
End: 2019-04-02

## 2019-04-02 VITALS
OXYGEN SATURATION: 97 % | HEART RATE: 97 BPM | RESPIRATION RATE: 16 BRPM | BODY MASS INDEX: 26.5 KG/M2 | WEIGHT: 135 LBS | HEIGHT: 60 IN | SYSTOLIC BLOOD PRESSURE: 112 MMHG | TEMPERATURE: 98 F | DIASTOLIC BLOOD PRESSURE: 76 MMHG

## 2019-04-02 DIAGNOSIS — K59.09 CHRONIC CONSTIPATION: ICD-10-CM

## 2019-04-02 DIAGNOSIS — R10.11 RUQ PAIN: ICD-10-CM

## 2019-04-02 DIAGNOSIS — K59.09 CHRONIC CONSTIPATION: Primary | ICD-10-CM

## 2019-04-02 PROCEDURE — 74018 RADEX ABDOMEN 1 VIEW: CPT | Performed by: FAMILY MEDICINE

## 2019-04-02 PROCEDURE — 85025 COMPLETE CBC W/AUTO DIFF WBC: CPT

## 2019-04-02 PROCEDURE — 80053 COMPREHEN METABOLIC PANEL: CPT

## 2019-04-02 PROCEDURE — 36415 COLL VENOUS BLD VENIPUNCTURE: CPT

## 2019-04-02 PROCEDURE — 99214 OFFICE O/P EST MOD 30 MIN: CPT | Performed by: FAMILY MEDICINE

## 2019-04-10 ENCOUNTER — HOSPITAL ENCOUNTER (OUTPATIENT)
Dept: GENERAL RADIOLOGY | Age: 49
Discharge: HOME | End: 2019-04-10
Attending: INTERNAL MEDICINE
Payer: COMMERCIAL

## 2019-04-10 DIAGNOSIS — K59.09 OTHER CONSTIPATION: ICD-10-CM

## 2019-04-10 PROCEDURE — 74018 RADEX ABDOMEN 1 VIEW: CPT | Performed by: INTERNAL MEDICINE

## 2019-04-12 ENCOUNTER — HOSPITAL ENCOUNTER (OUTPATIENT)
Dept: GENERAL RADIOLOGY | Age: 49
Discharge: HOME | End: 2019-04-12
Attending: INTERNAL MEDICINE
Payer: COMMERCIAL

## 2019-04-12 DIAGNOSIS — K59.09 OTHER CONSTIPATION: ICD-10-CM

## 2019-04-12 PROCEDURE — 74018 RADEX ABDOMEN 1 VIEW: CPT | Performed by: INTERNAL MEDICINE

## 2019-04-13 ENCOUNTER — HOSPITAL ENCOUNTER (OUTPATIENT)
Dept: ULTRASOUND IMAGING | Age: 49
Discharge: HOME | End: 2019-04-13
Attending: FAMILY MEDICINE
Payer: COMMERCIAL

## 2019-04-13 ENCOUNTER — APPOINTMENT (OUTPATIENT)
Dept: LAB | Age: 49
End: 2019-04-13
Attending: FAMILY MEDICINE
Payer: COMMERCIAL

## 2019-04-13 DIAGNOSIS — K59.09 OTHER CONSTIPATION: ICD-10-CM

## 2019-04-13 DIAGNOSIS — R10.11 RUQ PAIN: ICD-10-CM

## 2019-04-13 PROCEDURE — 36415 COLL VENOUS BLD VENIPUNCTURE: CPT

## 2019-04-13 PROCEDURE — 76700 US EXAM ABDOM COMPLETE: CPT | Performed by: FAMILY MEDICINE

## 2019-04-13 PROCEDURE — 84439 ASSAY OF FREE THYROXINE: CPT

## 2019-04-13 PROCEDURE — 84443 ASSAY THYROID STIM HORMONE: CPT

## 2019-04-14 NOTE — PROGRESS NOTES
Date: 2019    To: Yolis England  : 1970    I hope this letter finds you doing well. I am writing to inform you of the following:        The results of your recent lab for thyroid were normal, the x ray showed a few residual markers on the

## 2019-04-20 ENCOUNTER — HOSPITAL ENCOUNTER (OUTPATIENT)
Dept: MRI IMAGING | Age: 49
Discharge: HOME | End: 2019-04-20
Attending: FAMILY MEDICINE
Payer: COMMERCIAL

## 2019-04-20 DIAGNOSIS — R93.5 ABNORMAL ULTRASOUND OF ABDOMEN: ICD-10-CM

## 2019-04-20 PROCEDURE — 74183 MRI ABD W/O CNTR FLWD CNTR: CPT | Performed by: FAMILY MEDICINE

## 2019-04-20 PROCEDURE — A9581 GADOXETATE DISODIUM INJ: HCPCS | Performed by: FAMILY MEDICINE

## 2019-04-23 ENCOUNTER — TELEPHONE (OUTPATIENT)
Dept: FAMILY MEDICINE CLINIC | Facility: CLINIC | Age: 49
End: 2019-04-23

## 2019-04-24 ENCOUNTER — OFFICE VISIT (OUTPATIENT)
Dept: FAMILY MEDICINE CLINIC | Facility: CLINIC | Age: 49
End: 2019-04-24

## 2019-04-24 ENCOUNTER — TELEPHONE (OUTPATIENT)
Dept: FAMILY MEDICINE CLINIC | Facility: CLINIC | Age: 49
End: 2019-04-24

## 2019-04-24 VITALS
HEIGHT: 60 IN | SYSTOLIC BLOOD PRESSURE: 118 MMHG | TEMPERATURE: 99 F | WEIGHT: 136 LBS | BODY MASS INDEX: 26.7 KG/M2 | DIASTOLIC BLOOD PRESSURE: 84 MMHG | HEART RATE: 79 BPM | RESPIRATION RATE: 16 BRPM | OXYGEN SATURATION: 97 %

## 2019-04-24 DIAGNOSIS — D18.03 LIVER HEMANGIOMA: ICD-10-CM

## 2019-04-24 DIAGNOSIS — K76.89 FOCAL NODULAR HYPERPLASIA OF LIVER: ICD-10-CM

## 2019-04-24 DIAGNOSIS — K59.09 OTHER CONSTIPATION: Primary | ICD-10-CM

## 2019-04-24 PROCEDURE — 99213 OFFICE O/P EST LOW 20 MIN: CPT | Performed by: FAMILY MEDICINE

## 2019-04-24 NOTE — TELEPHONE ENCOUNTER
H PYLORI BACTERIA      PLS LET DR Kyler Buenrostro KNOW THIS IS SOMETHING SHE MAY WANT TO HAVE A BLOOD TEST FOR AND PT WAS HERE TODAY. DR MONTANEZ TOLD HER TO CALL BACK WITH THE NAME OF THE WORD THEY WERE DISCUSSING AT THE VISIT. PLS CALL BACK TO ADVISE.

## 2019-04-24 NOTE — PROGRESS NOTES
HPI:   Leighton Jones is a 50year old female here to follow up on MS and constipation .       Pt was diagnosed with MS in the 90's  MRI 2018 ; pt did reveal demyelinating plaques   Pt did not go on meds as recommended      Pt is doing her best with fibe Abdomen Sitz Marker Day 3 (cpt=74018)    Result Date: 4/10/2019  CONCLUSION:  No bowel obstruction. Large amount of stool seen throughout the colon.   There are 3 remaining Sitz markers, 2 in the mid descending colon and the 3rd in the mid to distal rectum simplex virus) anogenital infection    • MS (multiple sclerosis) ([facility] 1999   • MS (multiple sclerosis) ([facility] 4/28/2017      Past Surgical History:   Procedure Laterality Date   • ESOPHAGOGASTRODUODENOSCOPY, COLONOSCOPY, POSSIBLE BIOPSY, POSSIBLE POLYPECTO lesions  MUSCULOSKELETAL: back is not tender,FROM of the back  EXTREMITIES: no cyanosis, clubbing or edema  NEURO: cranial nerves are intact,motor and sensory are grossly intact    ASSESSMENT AND PLAN:   Michael Grijalva is a 50year old female here with

## 2019-04-25 NOTE — TELEPHONE ENCOUNTER
It pt taking any acid reducers ?   This can be discussed with Arnol El   It can be diagnosed with EGD or breath test

## 2019-04-25 NOTE — TELEPHONE ENCOUNTER
Pt notified and expressed understanding, she will make appt with Dr. Seth Orlando, pt not on any acid reducer. FYTI- she states she just started back on Avenex per Dr. Sabrina Suarez.

## 2019-04-26 ENCOUNTER — TELEPHONE (OUTPATIENT)
Dept: NEUROLOGY | Facility: CLINIC | Age: 49
End: 2019-04-26

## 2019-04-26 NOTE — TELEPHONE ENCOUNTER
Patient calling for refill of Avonex PEN to be sent to [facility].  Patient states the request that pharmacy has is the syringe and not the pen

## 2019-05-15 ENCOUNTER — TELEPHONE (OUTPATIENT)
Dept: NEUROLOGY | Facility: CLINIC | Age: 49
End: 2019-05-15

## 2019-05-15 NOTE — TELEPHONE ENCOUNTER
Per PSRs, patient called GRANT back  and states she has the letter from January that she will use. No further needs noted.

## 2019-06-04 ENCOUNTER — TELEPHONE (OUTPATIENT)
Dept: NEUROLOGY | Facility: CLINIC | Age: 49
End: 2019-06-04

## 2019-06-04 NOTE — TELEPHONE ENCOUNTER
Neurology on-call cross cover noted    Patient called - difficult to understand over phone but states she took Avonex Sunday and has been having fevers, and discomfort, burning sensation - asking for alternative to Avonex - has been taking ibuprofen and ty

## 2019-06-11 ENCOUNTER — TELEPHONE (OUTPATIENT)
Dept: NEUROLOGY | Facility: CLINIC | Age: 49
End: 2019-06-11

## 2019-06-11 NOTE — TELEPHONE ENCOUNTER
Message left on voicemail for patient to call [employer] to schedule delivery of Avonex. ([phone]).

## 2019-06-11 NOTE — TELEPHONE ENCOUNTER
Patient states she has cold symptoms, laryngitis and now has diarrhea after re-starting Avonex. Patient states she was on a lower dosage different prescriber) and she tolerated that fine. Patient would like to try lower dosage Avonex.

## 2019-06-20 ENCOUNTER — OFFICE VISIT (OUTPATIENT)
Dept: FAMILY MEDICINE CLINIC | Facility: CLINIC | Age: 49
End: 2019-06-20

## 2019-06-20 VITALS
DIASTOLIC BLOOD PRESSURE: 78 MMHG | RESPIRATION RATE: 18 BRPM | WEIGHT: 136 LBS | HEIGHT: 60 IN | OXYGEN SATURATION: 98 % | HEART RATE: 74 BPM | BODY MASS INDEX: 26.7 KG/M2 | SYSTOLIC BLOOD PRESSURE: 122 MMHG

## 2019-06-20 DIAGNOSIS — K62.89 RECTAL DISCOMFORT: ICD-10-CM

## 2019-06-20 DIAGNOSIS — N89.8 VAGINAL DISCHARGE: Primary | ICD-10-CM

## 2019-06-20 PROCEDURE — 87591 N.GONORRHOEAE DNA AMP PROB: CPT | Performed by: FAMILY MEDICINE

## 2019-06-20 PROCEDURE — 87480 CANDIDA DNA DIR PROBE: CPT | Performed by: FAMILY MEDICINE

## 2019-06-20 PROCEDURE — 87660 TRICHOMONAS VAGIN DIR PROBE: CPT | Performed by: FAMILY MEDICINE

## 2019-06-20 PROCEDURE — 87491 CHLMYD TRACH DNA AMP PROBE: CPT | Performed by: FAMILY MEDICINE

## 2019-06-20 PROCEDURE — 87510 GARDNER VAG DNA DIR PROBE: CPT | Performed by: FAMILY MEDICINE

## 2019-06-20 PROCEDURE — 99213 OFFICE O/P EST LOW 20 MIN: CPT | Performed by: FAMILY MEDICINE

## 2019-06-20 RX ORDER — METRONIDAZOLE 7.5 MG/G
1 GEL VAGINAL 2 TIMES DAILY
Qty: 1 TUBE | Refills: 1 | Status: SHIPPED | OUTPATIENT
Start: 2019-06-20 | End: 2019-06-25

## 2019-06-20 NOTE — PROGRESS NOTES
HPI:    Patient ID: Sheryl Reddy is a 50year old female. Vaginal Discharge   The patient's primary symptoms include vaginal discharge. This is a new problem. Episode onset: 1 week. The problem has been unchanged. The pain is mild.  She is not pregnan 1 tablet Rfl: 1   Fluticasone Propionate 50 MCG/ACT Nasal Suspension SHAKE LQ AND U 1 SPR IEN QD Disp:  Rfl: 0   ValACYclovir HCl 500 MG Oral Tab Take 1 tablet (500 mg total) by mouth 2 (two) times daily.  Disp: 6 tablet Rfl: 1   CALCIUM-VITAMIN D  mg Amplification [E]      Vaginitis/Vaginosis, Dna Probe      Meds This Visit:  Requested Prescriptions     Signed Prescriptions Disp Refills   • PROCTOZONE-HC 2.5 % Rectal Cream 1 Tube 0     Sig: Apply rectal BID x 1-2 weeks only   • metRONIDAZOLE 0.75 % Vag

## 2019-07-01 ENCOUNTER — OFFICE VISIT (OUTPATIENT)
Dept: FAMILY MEDICINE CLINIC | Facility: CLINIC | Age: 49
End: 2019-07-01

## 2019-07-01 VITALS
HEART RATE: 78 BPM | WEIGHT: 133 LBS | DIASTOLIC BLOOD PRESSURE: 72 MMHG | TEMPERATURE: 98 F | OXYGEN SATURATION: 98 % | HEIGHT: 60 IN | SYSTOLIC BLOOD PRESSURE: 108 MMHG | BODY MASS INDEX: 26.11 KG/M2 | RESPIRATION RATE: 18 BRPM

## 2019-07-01 DIAGNOSIS — R05.3 CHRONIC COUGH: Primary | ICD-10-CM

## 2019-07-01 DIAGNOSIS — N89.8 VAGINAL DISCHARGE: ICD-10-CM

## 2019-07-01 LAB
APPEARANCE: CLEAR
MULTISTIX LOT#: NORMAL NUMERIC
PH, URINE: 7 (ref 4.5–8)
SPECIFIC GRAVITY: 1.02 (ref 1–1.03)
UROBILINOGEN,SEMI-QN: 0.2 MG/DL (ref 0–1.9)

## 2019-07-01 PROCEDURE — 87510 GARDNER VAG DNA DIR PROBE: CPT | Performed by: FAMILY MEDICINE

## 2019-07-01 PROCEDURE — 87660 TRICHOMONAS VAGIN DIR PROBE: CPT | Performed by: FAMILY MEDICINE

## 2019-07-01 PROCEDURE — 99214 OFFICE O/P EST MOD 30 MIN: CPT | Performed by: FAMILY MEDICINE

## 2019-07-01 PROCEDURE — 87480 CANDIDA DNA DIR PROBE: CPT | Performed by: FAMILY MEDICINE

## 2019-07-01 PROCEDURE — 87086 URINE CULTURE/COLONY COUNT: CPT | Performed by: FAMILY MEDICINE

## 2019-07-01 PROCEDURE — 81003 URINALYSIS AUTO W/O SCOPE: CPT | Performed by: FAMILY MEDICINE

## 2019-07-01 RX ORDER — AMOXICILLIN AND CLAVULANATE POTASSIUM 875; 125 MG/1; MG/1
TABLET, FILM COATED ORAL
Refills: 0 | COMMUNITY
Start: 2019-06-08 | End: 2019-07-01 | Stop reason: ALTCHOICE

## 2019-07-01 RX ORDER — OMEPRAZOLE 40 MG/1
40 CAPSULE, DELAYED RELEASE ORAL DAILY
Qty: 30 CAPSULE | Refills: 0 | Status: SHIPPED | OUTPATIENT
Start: 2019-07-01 | End: 2019-12-14 | Stop reason: ALTCHOICE

## 2019-07-01 NOTE — PROGRESS NOTES
HPI:   Rheta Heimlich is a 50year old female here to follow up on MS, URI and vaginal concerns       Pt was diagnosed with MS in the 90's  MRI 2018 ; pt did reveal demyelinating plaques   Seeing neuro    Pt was sick in June ; pt was given abx / augment Surgical History:   Procedure Laterality Date   • ESOPHAGOGASTRODUODENOSCOPY, COLONOSCOPY, POSSIBLE BIOPSY, POSSIBLE POLYPECTOMY 38519, 75762 N/A 1/5/2017    Performed by Jesenia Ann MD at [facility]   • HYSTERECTOMY  11/2012    Princess lesions  MUSCULOSKELETAL: back is not tender,FROM of the back  EXTREMITIES: no cyanosis, clubbing or edema  NEURO: cranial nerves are intact,motor and sensory are grossly intact    ASSESSMENT AND PLAN:   Delvis German is a 50year old female here with

## 2019-07-09 ENCOUNTER — TELEPHONE (OUTPATIENT)
Dept: FAMILY MEDICINE CLINIC | Facility: CLINIC | Age: 49
End: 2019-07-09

## 2019-07-09 DIAGNOSIS — B00.9 HSV INFECTION: ICD-10-CM

## 2019-07-09 RX ORDER — VALACYCLOVIR HYDROCHLORIDE 500 MG/1
500 TABLET, FILM COATED ORAL 2 TIMES DAILY
Qty: 6 TABLET | Refills: 0 | Status: SHIPPED | OUTPATIENT
Start: 2019-07-09 | End: 2019-07-10 | Stop reason: ALTCHOICE

## 2019-07-10 ENCOUNTER — OFFICE VISIT (OUTPATIENT)
Dept: NEUROLOGY | Facility: CLINIC | Age: 49
End: 2019-07-10

## 2019-07-10 VITALS
BODY MASS INDEX: 26 KG/M2 | RESPIRATION RATE: 16 BRPM | HEART RATE: 72 BPM | SYSTOLIC BLOOD PRESSURE: 120 MMHG | WEIGHT: 133 LBS | DIASTOLIC BLOOD PRESSURE: 68 MMHG

## 2019-07-10 DIAGNOSIS — G35 MS (MULTIPLE SCLEROSIS) (HCC): Primary | ICD-10-CM

## 2019-07-10 PROCEDURE — 99213 OFFICE O/P EST LOW 20 MIN: CPT | Performed by: OTHER

## 2019-07-10 NOTE — PROGRESS NOTES
The patient states she stopped avonex due to it causing flu like symptoms. Patient stopped the medication 06/10/19. The patient states no flares since her last office visit.

## 2019-07-10 NOTE — PROGRESS NOTES
HPI:    Patient ID: Riccardo Mccoy is a 50year old female. Multiple Sclerosis       Toño Rodriguez is a 50year old female who presented for follow up for Multiple sclerosis. She has been on and off on Avonex and stopped taking it completely last month.  St Rfl: 0   AVONEX PREFILLED 30 MCG/0.5ML Intramuscular Prefilled Syringe Kit  Disp:  Rfl: 0   Fluticasone Propionate 50 MCG/ACT Nasal Suspension SHAKE LQ AND U 1 SPR IEN QD Disp:  Rfl: 0   CALCIUM-VITAMIN D  mg daily.  Disp:  Rfl:    Nutritional Supplem Avonex due to intolerance. Discussed risk vs benefit DMT  Patient elected not be on any medications. Her disease overall mild and stable    RTC in about 5-6 months.  Will repeat MRI brain Feb 2020 or sooner if clinically necessary  See orders and medication

## 2019-08-13 ENCOUNTER — TELEPHONE (OUTPATIENT)
Dept: NEUROLOGY | Facility: CLINIC | Age: 49
End: 2019-08-13

## 2019-09-30 ENCOUNTER — TELEPHONE (OUTPATIENT)
Dept: NEUROLOGY | Facility: CLINIC | Age: 49
End: 2019-09-30

## 2019-12-14 ENCOUNTER — OFFICE VISIT (OUTPATIENT)
Dept: FAMILY MEDICINE CLINIC | Facility: CLINIC | Age: 49
End: 2019-12-14

## 2019-12-14 VITALS
DIASTOLIC BLOOD PRESSURE: 80 MMHG | HEIGHT: 60 IN | WEIGHT: 132 LBS | RESPIRATION RATE: 18 BRPM | SYSTOLIC BLOOD PRESSURE: 130 MMHG | HEART RATE: 80 BPM | BODY MASS INDEX: 25.91 KG/M2

## 2019-12-14 DIAGNOSIS — Z12.31 ENCOUNTER FOR SCREENING MAMMOGRAM FOR HIGH-RISK PATIENT: ICD-10-CM

## 2019-12-14 DIAGNOSIS — Z12.11 COLON CANCER SCREENING: ICD-10-CM

## 2019-12-14 DIAGNOSIS — Z00.00 ANNUAL PHYSICAL EXAM: Primary | ICD-10-CM

## 2019-12-14 PROCEDURE — 81003 URINALYSIS AUTO W/O SCOPE: CPT | Performed by: FAMILY MEDICINE

## 2019-12-14 PROCEDURE — 99396 PREV VISIT EST AGE 40-64: CPT | Performed by: FAMILY MEDICINE

## 2019-12-14 RX ORDER — FLUTICASONE PROPIONATE 0.05 %
CREAM (GRAM) TOPICAL
Qty: 15 G | Refills: 0 | Status: SHIPPED | OUTPATIENT
Start: 2019-12-14 | End: 2021-05-18

## 2019-12-14 NOTE — PROGRESS NOTES
HPI:   Rheta Heimlich is a 52year old female who presents for a complete physical exam.     Wt Readings from Last 6 Encounters:  12/14/19 : 132 lb (59.9 kg)  07/10/19 : 133 lb (60.3 kg)  07/01/19 : 133 lb (60.3 kg)  06/20/19 : 136 lb (61.7 kg)  04/24/1 Smoking status: Never Smoker      Smokeless tobacco: Never Used    Alcohol use: No      Alcohol/week: 0.0 standard drinks    Drug use: No    Occ: . : . Children: .    Exercise: minimal.  Diet: watches minimally     REVIEW OF SYSTEMS:   GENERAL: feels INTERNAL    3. Encounter for screening mammogram for high-risk patient    - [facility] ANAIS 2D+3D SCREENING BILAT (CPT=77067/53822); Future      Discussed diet, exercise,calcium, vitamin D, fish oil and self breast exams.  Questions answered and patient indicates

## 2019-12-27 ENCOUNTER — LAB ENCOUNTER (OUTPATIENT)
Dept: LAB | Age: 49
End: 2019-12-27
Attending: FAMILY MEDICINE
Payer: COMMERCIAL

## 2019-12-27 DIAGNOSIS — Z00.00 ANNUAL PHYSICAL EXAM: ICD-10-CM

## 2019-12-27 PROCEDURE — 82306 VITAMIN D 25 HYDROXY: CPT

## 2019-12-27 PROCEDURE — 82607 VITAMIN B-12: CPT

## 2019-12-27 PROCEDURE — 84443 ASSAY THYROID STIM HORMONE: CPT

## 2019-12-27 PROCEDURE — 85025 COMPLETE CBC W/AUTO DIFF WBC: CPT

## 2019-12-27 PROCEDURE — 80053 COMPREHEN METABOLIC PANEL: CPT

## 2019-12-27 PROCEDURE — 80061 LIPID PANEL: CPT

## 2019-12-27 PROCEDURE — 36415 COLL VENOUS BLD VENIPUNCTURE: CPT

## 2019-12-27 PROCEDURE — 84439 ASSAY OF FREE THYROXINE: CPT

## 2019-12-31 DIAGNOSIS — E55.9 VITAMIN D DEFICIENCY: Primary | ICD-10-CM

## 2019-12-31 DIAGNOSIS — E78.00 ELEVATED CHOLESTEROL: ICD-10-CM

## 2020-01-18 ENCOUNTER — HOSPITAL ENCOUNTER (OUTPATIENT)
Dept: MAMMOGRAPHY | Age: 50
Discharge: HOME | End: 2020-01-18
Attending: FAMILY MEDICINE
Payer: COMMERCIAL

## 2020-01-18 DIAGNOSIS — Z12.31 ENCOUNTER FOR SCREENING MAMMOGRAM FOR HIGH-RISK PATIENT: ICD-10-CM

## 2020-01-18 PROCEDURE — 77067 SCR MAMMO BI INCL CAD: CPT | Performed by: FAMILY MEDICINE

## 2020-01-18 PROCEDURE — 77063 BREAST TOMOSYNTHESIS BI: CPT | Performed by: FAMILY MEDICINE

## 2020-02-03 ENCOUNTER — APPOINTMENT (OUTPATIENT)
Dept: LAB | Age: 50
End: 2020-02-03
Attending: FAMILY MEDICINE
Payer: COMMERCIAL

## 2020-02-03 ENCOUNTER — HOSPITAL ENCOUNTER (OUTPATIENT)
Dept: GENERAL RADIOLOGY | Age: 50
Discharge: HOME | End: 2020-02-03
Attending: FAMILY MEDICINE
Payer: COMMERCIAL

## 2020-02-03 ENCOUNTER — OFFICE VISIT (OUTPATIENT)
Dept: FAMILY MEDICINE CLINIC | Facility: CLINIC | Age: 50
End: 2020-02-03

## 2020-02-03 ENCOUNTER — LAB ENCOUNTER (OUTPATIENT)
Dept: LAB | Age: 50
End: 2020-02-03
Attending: FAMILY MEDICINE
Payer: COMMERCIAL

## 2020-02-03 VITALS
HEIGHT: 60 IN | OXYGEN SATURATION: 99 % | RESPIRATION RATE: 18 BRPM | WEIGHT: 132 LBS | HEART RATE: 70 BPM | DIASTOLIC BLOOD PRESSURE: 80 MMHG | TEMPERATURE: 98 F | SYSTOLIC BLOOD PRESSURE: 126 MMHG | BODY MASS INDEX: 25.91 KG/M2

## 2020-02-03 DIAGNOSIS — R07.89 OTHER CHEST PAIN: ICD-10-CM

## 2020-02-03 DIAGNOSIS — R07.89 OTHER CHEST PAIN: Primary | ICD-10-CM

## 2020-02-03 LAB
ALBUMIN SERPL-MCNC: 3.6 G/DL (ref 3.4–5)
ALBUMIN/GLOB SERPL: 0.9 {RATIO} (ref 1–2)
ALP LIVER SERPL-CCNC: 46 U/L (ref 39–100)
ALT SERPL-CCNC: 15 U/L (ref 13–56)
ANION GAP SERPL CALC-SCNC: 5 MMOL/L (ref 0–18)
AST SERPL-CCNC: 13 U/L (ref 15–37)
ATRIAL RATE: 66 BPM
BASOPHILS # BLD AUTO: 0.09 X10(3) UL (ref 0–0.2)
BASOPHILS NFR BLD AUTO: 1.2 %
BILIRUB SERPL-MCNC: 0.4 MG/DL (ref 0.1–2)
BUN BLD-MCNC: 17 MG/DL (ref 7–18)
BUN/CREAT SERPL: 25 (ref 10–20)
CALCIUM BLD-MCNC: 8.5 MG/DL (ref 8.5–10.1)
CHLORIDE SERPL-SCNC: 106 MMOL/L (ref 98–112)
CO2 SERPL-SCNC: 28 MMOL/L (ref 21–32)
CREAT BLD-MCNC: 0.68 MG/DL (ref 0.55–1.02)
D-DIMER: 0.36 UG/ML FEU (ref ?–0.5)
DEPRECATED RDW RBC AUTO: 43.7 FL (ref 35.1–46.3)
EOSINOPHIL # BLD AUTO: 0.09 X10(3) UL (ref 0–0.7)
EOSINOPHIL NFR BLD AUTO: 1.2 %
ERYTHROCYTE [DISTWIDTH] IN BLOOD BY AUTOMATED COUNT: 13.3 % (ref 11–15)
GLOBULIN PLAS-MCNC: 3.9 G/DL (ref 2.8–4.4)
GLUCOSE BLD-MCNC: 92 MG/DL (ref 70–99)
HCT VFR BLD AUTO: 40.6 % (ref 35–48)
HGB BLD-MCNC: 12.9 G/DL (ref 12–16)
IMM GRANULOCYTES # BLD AUTO: 0.02 X10(3) UL (ref 0–1)
IMM GRANULOCYTES NFR BLD: 0.3 %
LYMPHOCYTES # BLD AUTO: 3.49 X10(3) UL (ref 1–4)
LYMPHOCYTES NFR BLD AUTO: 45.7 %
M PROTEIN MFR SERPL ELPH: 7.5 G/DL (ref 6.4–8.2)
MCH RBC QN AUTO: 28.4 PG (ref 26–34)
MCHC RBC AUTO-ENTMCNC: 31.8 G/DL (ref 31–37)
MCV RBC AUTO: 89.4 FL (ref 80–100)
MONOCYTES # BLD AUTO: 0.53 X10(3) UL (ref 0.1–1)
MONOCYTES NFR BLD AUTO: 6.9 %
NEUTROPHILS # BLD AUTO: 3.41 X10 (3) UL (ref 1.5–7.7)
NEUTROPHILS # BLD AUTO: 3.41 X10(3) UL (ref 1.5–7.7)
NEUTROPHILS NFR BLD AUTO: 44.7 %
OSMOLALITY SERPL CALC.SUM OF ELEC: 289 MOSM/KG (ref 275–295)
P AXIS: 70 DEGREES
P-R INTERVAL: 130 MS
PATIENT FASTING Y/N/NP: NO
PLATELET # BLD AUTO: 279 10(3)UL (ref 150–450)
POTASSIUM SERPL-SCNC: 3.8 MMOL/L (ref 3.5–5.1)
Q-T INTERVAL: 366 MS
QRS DURATION: 72 MS
QTC CALCULATION (BEZET): 383 MS
R AXIS: 52 DEGREES
RBC # BLD AUTO: 4.54 X10(6)UL (ref 3.8–5.3)
SODIUM SERPL-SCNC: 139 MMOL/L (ref 136–145)
T AXIS: 55 DEGREES
T4 FREE SERPL-MCNC: 0.9 NG/DL (ref 0.8–1.7)
TSI SER-ACNC: 0.58 MIU/ML (ref 0.36–3.74)
VENTRICULAR RATE: 66 BPM
WBC # BLD AUTO: 7.6 X10(3) UL (ref 4–11)

## 2020-02-03 PROCEDURE — 99214 OFFICE O/P EST MOD 30 MIN: CPT | Performed by: FAMILY MEDICINE

## 2020-02-03 PROCEDURE — 93005 ELECTROCARDIOGRAM TRACING: CPT

## 2020-02-03 PROCEDURE — 85379 FIBRIN DEGRADATION QUANT: CPT

## 2020-02-03 PROCEDURE — 84439 ASSAY OF FREE THYROXINE: CPT

## 2020-02-03 PROCEDURE — 85025 COMPLETE CBC W/AUTO DIFF WBC: CPT

## 2020-02-03 PROCEDURE — 80053 COMPREHEN METABOLIC PANEL: CPT

## 2020-02-03 PROCEDURE — 71046 X-RAY EXAM CHEST 2 VIEWS: CPT | Performed by: FAMILY MEDICINE

## 2020-02-03 PROCEDURE — 36415 COLL VENOUS BLD VENIPUNCTURE: CPT

## 2020-02-03 PROCEDURE — 93010 ELECTROCARDIOGRAM REPORT: CPT | Performed by: INTERNAL MEDICINE

## 2020-02-03 PROCEDURE — 84443 ASSAY THYROID STIM HORMONE: CPT

## 2020-02-03 NOTE — PROGRESS NOTES
HPI:   Norma Espinal is a 52year old female who presents with chest pain     It started last week   Pt started to have mid line chest pain sharp 9/10 pain - pt had episodes while driving her car   It lasted 20 minutes   Pt has had 2-3 weaker episodes Alcohol/week: 0.0 standard drinks    Drug use: No    Occ: . : . Children: .    Exercise: minimal.  Diet: watches minimally     REVIEW OF SYSTEMS:   GENERAL: feels well otherwise  SKIN: denies any unusual skin lesions  EYES:denies blurred vision or do (CPT=71046); Future    Questions answered and patient indicates understanding of these issues and agrees to the plan. Follow up in 1 mo or sooner if needed .

## 2020-02-04 ENCOUNTER — TELEPHONE (OUTPATIENT)
Dept: FAMILY MEDICINE CLINIC | Facility: CLINIC | Age: 50
End: 2020-02-04

## 2020-02-04 DIAGNOSIS — R07.9 CHEST PAIN, UNSPECIFIED TYPE: Primary | ICD-10-CM

## 2020-02-04 NOTE — TELEPHONE ENCOUNTER
Spoke with pt about getting echo dashawn.  Pt understood and will dashawn      By Alexander Dietz, [address]

## 2020-02-04 NOTE — TELEPHONE ENCOUNTER
----- Message from Marlin Marie DO sent at 2/4/2020  7:31 AM CST -----  Abnormal EKG   Check stress echo   To ER if worse

## 2020-02-05 ENCOUNTER — HOSPITAL ENCOUNTER (OUTPATIENT)
Dept: CV DIAGNOSTICS | Age: 50
Discharge: HOME | End: 2020-02-05
Attending: FAMILY MEDICINE
Payer: COMMERCIAL

## 2020-02-05 DIAGNOSIS — R07.9 CHEST PAIN, UNSPECIFIED TYPE: ICD-10-CM

## 2020-02-05 PROCEDURE — 93017 CV STRESS TEST TRACING ONLY: CPT | Performed by: FAMILY MEDICINE

## 2020-02-05 PROCEDURE — 93350 STRESS TTE ONLY: CPT | Performed by: FAMILY MEDICINE

## 2020-02-05 PROCEDURE — 93018 CV STRESS TEST I&R ONLY: CPT | Performed by: FAMILY MEDICINE

## 2020-02-08 ENCOUNTER — HOSPITAL ENCOUNTER (OUTPATIENT)
Dept: MRI IMAGING | Age: 50
Discharge: HOME | End: 2020-02-08
Attending: Other
Payer: COMMERCIAL

## 2020-02-08 DIAGNOSIS — G35 MS (MULTIPLE SCLEROSIS) (HCC): ICD-10-CM

## 2020-02-08 PROCEDURE — A9575 INJ GADOTERATE MEGLUMI 0.1ML: HCPCS | Performed by: OTHER

## 2020-02-08 PROCEDURE — 70553 MRI BRAIN STEM W/O & W/DYE: CPT | Performed by: OTHER

## 2020-02-11 ENCOUNTER — TELEPHONE (OUTPATIENT)
Dept: NEUROLOGY | Facility: CLINIC | Age: 50
End: 2020-02-11

## 2020-02-11 NOTE — TELEPHONE ENCOUNTER
Relayed results to pt.  Verbalized understanding, no further questions.      ----- Message from Erika Garza MD sent at 2/10/2020 10:29 AM CST -----  Stable disease

## 2020-02-16 ENCOUNTER — HOSPITAL ENCOUNTER (EMERGENCY)
Facility: HOSPITAL | Age: 50
Discharge: HOME | End: 2020-02-16
Payer: COMMERCIAL

## 2020-02-16 ENCOUNTER — APPOINTMENT (OUTPATIENT)
Dept: GENERAL RADIOLOGY | Facility: HOSPITAL | Age: 50
End: 2020-02-16
Payer: COMMERCIAL

## 2020-02-16 ENCOUNTER — APPOINTMENT (OUTPATIENT)
Dept: GENERAL RADIOLOGY | Facility: HOSPITAL | Age: 50
End: 2020-02-16
Attending: EMERGENCY MEDICINE
Payer: COMMERCIAL

## 2020-02-16 VITALS
DIASTOLIC BLOOD PRESSURE: 73 MMHG | OXYGEN SATURATION: 100 % | TEMPERATURE: 97 F | RESPIRATION RATE: 16 BRPM | SYSTOLIC BLOOD PRESSURE: 118 MMHG | HEIGHT: 60 IN | WEIGHT: 135 LBS | BODY MASS INDEX: 26.5 KG/M2 | HEART RATE: 65 BPM

## 2020-02-16 DIAGNOSIS — M54.9 UPPER BACK PAIN ON LEFT SIDE: ICD-10-CM

## 2020-02-16 DIAGNOSIS — M79.602 LEFT ARM PAIN: Primary | ICD-10-CM

## 2020-02-16 LAB
ANION GAP SERPL CALC-SCNC: 3 MMOL/L (ref 0–18)
BASOPHILS # BLD AUTO: 0.05 X10(3) UL (ref 0–0.2)
BASOPHILS NFR BLD AUTO: 0.8 %
BUN BLD-MCNC: 12 MG/DL (ref 7–18)
BUN/CREAT SERPL: 17.6 (ref 10–20)
CALCIUM BLD-MCNC: 8.5 MG/DL (ref 8.5–10.1)
CHLORIDE SERPL-SCNC: 109 MMOL/L (ref 98–112)
CO2 SERPL-SCNC: 29 MMOL/L (ref 21–32)
CREAT BLD-MCNC: 0.68 MG/DL (ref 0.55–1.02)
D DIMER PPP FEU-MCNC: 0.46 UG/ML FEU (ref ?–0.5)
DEPRECATED RDW RBC AUTO: 41.6 FL (ref 35.1–46.3)
EOSINOPHIL # BLD AUTO: 0.09 X10(3) UL (ref 0–0.7)
EOSINOPHIL NFR BLD AUTO: 1.5 %
ERYTHROCYTE [DISTWIDTH] IN BLOOD BY AUTOMATED COUNT: 12.9 % (ref 11–15)
GLUCOSE BLD-MCNC: 83 MG/DL (ref 70–99)
HCT VFR BLD AUTO: 38.1 % (ref 35–48)
HGB BLD-MCNC: 12.4 G/DL (ref 12–16)
IMM GRANULOCYTES # BLD AUTO: 0.02 X10(3) UL (ref 0–1)
IMM GRANULOCYTES NFR BLD: 0.3 %
LYMPHOCYTES # BLD AUTO: 2.82 X10(3) UL (ref 1–4)
LYMPHOCYTES NFR BLD AUTO: 46.6 %
MCH RBC QN AUTO: 28.6 PG (ref 26–34)
MCHC RBC AUTO-ENTMCNC: 32.5 G/DL (ref 31–37)
MCV RBC AUTO: 87.8 FL (ref 80–100)
MONOCYTES # BLD AUTO: 0.55 X10(3) UL (ref 0.1–1)
MONOCYTES NFR BLD AUTO: 9.1 %
NEUTROPHILS # BLD AUTO: 2.52 X10 (3) UL (ref 1.5–7.7)
NEUTROPHILS # BLD AUTO: 2.52 X10(3) UL (ref 1.5–7.7)
NEUTROPHILS NFR BLD AUTO: 41.7 %
OSMOLALITY SERPL CALC.SUM OF ELEC: 291 MOSM/KG (ref 275–295)
PLATELET # BLD AUTO: 278 10(3)UL (ref 150–450)
POTASSIUM SERPL-SCNC: 4.2 MMOL/L (ref 3.5–5.1)
RBC # BLD AUTO: 4.34 X10(6)UL (ref 3.8–5.3)
SODIUM SERPL-SCNC: 141 MMOL/L (ref 136–145)
TROPONIN I SERPL-MCNC: <0.045 NG/ML (ref ?–0.04)
WBC # BLD AUTO: 6.1 X10(3) UL (ref 4–11)

## 2020-02-16 PROCEDURE — 99285 EMERGENCY DEPT VISIT HI MDM: CPT

## 2020-02-16 PROCEDURE — 93010 ELECTROCARDIOGRAM REPORT: CPT | Performed by: INTERNAL MEDICINE

## 2020-02-16 PROCEDURE — 72040 X-RAY EXAM NECK SPINE 2-3 VW: CPT | Performed by: EMERGENCY MEDICINE

## 2020-02-16 PROCEDURE — 71046 X-RAY EXAM CHEST 2 VIEWS: CPT

## 2020-02-16 PROCEDURE — 93005 ELECTROCARDIOGRAM TRACING: CPT

## 2020-02-16 PROCEDURE — 85025 COMPLETE CBC W/AUTO DIFF WBC: CPT

## 2020-02-16 PROCEDURE — 84484 ASSAY OF TROPONIN QUANT: CPT

## 2020-02-16 PROCEDURE — 85379 FIBRIN DEGRADATION QUANT: CPT | Performed by: EMERGENCY MEDICINE

## 2020-02-16 PROCEDURE — 80048 BASIC METABOLIC PNL TOTAL CA: CPT

## 2020-02-16 PROCEDURE — 96374 THER/PROPH/DIAG INJ IV PUSH: CPT

## 2020-02-16 RX ORDER — KETOROLAC TROMETHAMINE 15 MG/ML
15 INJECTION, SOLUTION INTRAMUSCULAR; INTRAVENOUS ONCE
Status: COMPLETED | OUTPATIENT
Start: 2020-02-16 | End: 2020-02-16

## 2020-02-16 RX ORDER — DIAZEPAM 2 MG/1
2 TABLET ORAL 3 TIMES DAILY PRN
Qty: 20 TABLET | Refills: 0 | Status: SHIPPED | OUTPATIENT
Start: 2020-02-16 | End: 2020-02-23

## 2020-02-16 RX ORDER — DIAZEPAM 5 MG/ML
5 INJECTION, SOLUTION INTRAMUSCULAR; INTRAVENOUS ONCE
Status: DISCONTINUED | OUTPATIENT
Start: 2020-02-16 | End: 2020-02-16

## 2020-02-16 RX ORDER — NAPROXEN 500 MG/1
500 TABLET ORAL 2 TIMES DAILY PRN
Qty: 14 TABLET | Refills: 0 | Status: SHIPPED | OUTPATIENT
Start: 2020-02-16 | End: 2020-02-23

## 2020-02-16 NOTE — ED PROVIDER NOTES
Patient Seen in: [facility] Emergency Department      History   Patient presents with:  Arm Pain    Stated Complaint: left arm pain    HPI    Patient presents to the emergency department stating that she began having left upper back pain which rad Pulse 65   Temp 97.3 °F (36.3 °C) (Oral)   Resp 16   Ht 152.4 cm (5')   Wt 61.2 kg   SpO2 100%   BMI 26.37 kg/m²         Physical Exam  Vitals signs and nursing note reviewed. Constitutional:       General: She is not in acute distress.      Appearance: S The following orders were created for panel order CBC WITH DIFFERENTIAL WITH PLATELET.   Procedure                               Abnormality         Status                     ---------                               -----------         ------ wishes to be discharged still.             Disposition and Plan     Clinical Impression:  Left arm pain  (primary encounter diagnosis)  Upper back pain on left side    Disposition:  Discharge  2/16/2020  2:13 pm    Follow-up:  Belén Bhatti DO  [address]

## 2020-02-20 ENCOUNTER — OFFICE VISIT (OUTPATIENT)
Dept: FAMILY MEDICINE CLINIC | Facility: CLINIC | Age: 50
End: 2020-02-20

## 2020-02-20 VITALS
DIASTOLIC BLOOD PRESSURE: 80 MMHG | OXYGEN SATURATION: 99 % | RESPIRATION RATE: 16 BRPM | HEART RATE: 98 BPM | SYSTOLIC BLOOD PRESSURE: 126 MMHG

## 2020-02-20 DIAGNOSIS — K62.89 RECTAL DISCOMFORT: ICD-10-CM

## 2020-02-20 DIAGNOSIS — R07.89 ATYPICAL CHEST PAIN: ICD-10-CM

## 2020-02-20 DIAGNOSIS — M54.2 NECK PAIN: Primary | ICD-10-CM

## 2020-02-20 PROCEDURE — 99214 OFFICE O/P EST MOD 30 MIN: CPT | Performed by: FAMILY MEDICINE

## 2020-02-20 NOTE — PROGRESS NOTES
HPI:   Christie Art is a 52year old female who presents with left arm pain     Pt was seen in the ER 2/16/20   Muscle pain on exam / normal imaging   Pt never took the meds  Pt has less pain   Pt is right handed  Worse with work duties such as liftin daily.     • Nutritional Supplements (ADULT NUTRITIONAL SUPPLEMENT OR) Take by mouth. Black seed oil 1 teaspoon twice a day     • Multiple Vitamins-Minerals (WOMENS ONE DAILY OR) Take by mouth daily.         Past Medical History:   Diagnosis Date   • Arthri nourished,in no apparent distress  CARDIO: RRR without murmur  LUNGS: clear to auscultation  NECK: supple,no adenopathy,no thyromegaly, no JVD no carotid bruits, + cervical neck pain   UE.5+ strength 2+ DTR's normal sensation   HEENT: atraumatic, normoceph

## 2020-05-07 NOTE — TELEPHONE ENCOUNTER
Rec'd incoming fax from [facility] [employer] [facility] stating that their nurse has been trying to contact pt for updates on patient status. They are requesting confirmation on patient contact information. Note that they have the same contact information that we have.   A
This lesion may be a recurrence of SCCis treated with EDC on 10/2/2019 or a de georgi NMSC as I cannot exactly locate the EDC scar from last visit.  Mohs if + for NMSC.
Detail Level: Simple

## 2020-06-09 ENCOUNTER — OFFICE VISIT (OUTPATIENT)
Dept: FAMILY MEDICINE CLINIC | Facility: CLINIC | Age: 50
End: 2020-06-09

## 2020-06-09 VITALS
OXYGEN SATURATION: 98 % | WEIGHT: 132 LBS | SYSTOLIC BLOOD PRESSURE: 126 MMHG | DIASTOLIC BLOOD PRESSURE: 72 MMHG | TEMPERATURE: 98 F | RESPIRATION RATE: 18 BRPM | BODY MASS INDEX: 25.91 KG/M2 | HEART RATE: 78 BPM | HEIGHT: 60 IN

## 2020-06-09 DIAGNOSIS — G89.29 CHRONIC PAIN OF RIGHT KNEE: ICD-10-CM

## 2020-06-09 DIAGNOSIS — M25.561 CHRONIC PAIN OF RIGHT KNEE: ICD-10-CM

## 2020-06-09 DIAGNOSIS — G35 MS (MULTIPLE SCLEROSIS) (HCC): ICD-10-CM

## 2020-06-09 DIAGNOSIS — R52 PAIN: ICD-10-CM

## 2020-06-09 DIAGNOSIS — K59.09 CHRONIC CONSTIPATION: Primary | ICD-10-CM

## 2020-06-09 PROCEDURE — 87086 URINE CULTURE/COLONY COUNT: CPT | Performed by: FAMILY MEDICINE

## 2020-06-09 PROCEDURE — 81003 URINALYSIS AUTO W/O SCOPE: CPT | Performed by: FAMILY MEDICINE

## 2020-06-09 PROCEDURE — 99214 OFFICE O/P EST MOD 30 MIN: CPT | Performed by: FAMILY MEDICINE

## 2020-06-09 NOTE — PROGRESS NOTES
HPI:   Guera Melo is a 52year old female who presents with right side pain and right knee pain       It started a few months ago   Pain is episodic   Pt reports the pain in the right side   Better with a BM   Pt has h/o chronic constipation - pt wa standard drinks    Drug use: No    Occ: . : . Children: .    Exercise: minimal.  Diet: watches minimally     REVIEW OF SYSTEMS:   GENERAL: feels well otherwise  SKIN: denies any unusual skin lesions  EYES:denies blurred vision or double vision  HEENT Future        Questions answered and patient indicates understanding of these issues and agrees to the plan. Follow up in 1 mo or sooner if needed .

## 2020-07-17 ENCOUNTER — HOSPITAL ENCOUNTER (OUTPATIENT)
Dept: GENERAL RADIOLOGY | Age: 50
Discharge: HOME | End: 2020-07-17
Attending: FAMILY MEDICINE
Payer: COMMERCIAL

## 2020-07-17 ENCOUNTER — LAB ENCOUNTER (OUTPATIENT)
Dept: LAB | Age: 50
End: 2020-07-17
Attending: FAMILY MEDICINE
Payer: COMMERCIAL

## 2020-07-17 DIAGNOSIS — E55.9 VITAMIN D DEFICIENCY: ICD-10-CM

## 2020-07-17 DIAGNOSIS — M25.561 CHRONIC PAIN OF RIGHT KNEE: ICD-10-CM

## 2020-07-17 DIAGNOSIS — G89.29 CHRONIC PAIN OF RIGHT KNEE: ICD-10-CM

## 2020-07-17 DIAGNOSIS — E78.00 ELEVATED CHOLESTEROL: ICD-10-CM

## 2020-07-17 DIAGNOSIS — K59.09 CHRONIC CONSTIPATION: ICD-10-CM

## 2020-07-17 LAB
ALBUMIN SERPL-MCNC: 3.7 G/DL (ref 3.4–5)
ALBUMIN/GLOB SERPL: 0.9 {RATIO} (ref 1–2)
ALP LIVER SERPL-CCNC: 54 U/L (ref 39–100)
ALT SERPL-CCNC: 16 U/L (ref 13–56)
ANION GAP SERPL CALC-SCNC: 4 MMOL/L (ref 0–18)
AST SERPL-CCNC: 12 U/L (ref 15–37)
BASOPHILS # BLD AUTO: 0.06 X10(3) UL (ref 0–0.2)
BASOPHILS NFR BLD AUTO: 0.9 %
BILIRUB SERPL-MCNC: 0.2 MG/DL (ref 0.1–2)
BUN BLD-MCNC: 15 MG/DL (ref 7–18)
BUN/CREAT SERPL: 24.2 (ref 10–20)
CALCIUM BLD-MCNC: 8.9 MG/DL (ref 8.5–10.1)
CHLORIDE SERPL-SCNC: 108 MMOL/L (ref 98–112)
CHOLEST SMN-MCNC: 188 MG/DL (ref ?–200)
CO2 SERPL-SCNC: 27 MMOL/L (ref 21–32)
CREAT BLD-MCNC: 0.62 MG/DL (ref 0.55–1.02)
DEPRECATED RDW RBC AUTO: 45.1 FL (ref 35.1–46.3)
EOSINOPHIL # BLD AUTO: 0.06 X10(3) UL (ref 0–0.7)
EOSINOPHIL NFR BLD AUTO: 0.9 %
ERYTHROCYTE [DISTWIDTH] IN BLOOD BY AUTOMATED COUNT: 13.4 % (ref 11–15)
GLOBULIN PLAS-MCNC: 3.9 G/DL (ref 2.8–4.4)
GLUCOSE BLD-MCNC: 84 MG/DL (ref 70–99)
HCT VFR BLD AUTO: 42.1 % (ref 35–48)
HDLC SERPL-MCNC: 66 MG/DL (ref 40–59)
HGB BLD-MCNC: 12.9 G/DL (ref 12–16)
IMM GRANULOCYTES # BLD AUTO: 0.05 X10(3) UL (ref 0–1)
IMM GRANULOCYTES NFR BLD: 0.7 %
LDLC SERPL CALC-MCNC: 110 MG/DL (ref ?–100)
LYMPHOCYTES # BLD AUTO: 2.31 X10(3) UL (ref 1–4)
LYMPHOCYTES NFR BLD AUTO: 34.5 %
M PROTEIN MFR SERPL ELPH: 7.6 G/DL (ref 6.4–8.2)
MCH RBC QN AUTO: 28 PG (ref 26–34)
MCHC RBC AUTO-ENTMCNC: 30.6 G/DL (ref 31–37)
MCV RBC AUTO: 91.3 FL (ref 80–100)
MONOCYTES # BLD AUTO: 0.36 X10(3) UL (ref 0.1–1)
MONOCYTES NFR BLD AUTO: 5.4 %
NEUTROPHILS # BLD AUTO: 3.86 X10 (3) UL (ref 1.5–7.7)
NEUTROPHILS # BLD AUTO: 3.86 X10(3) UL (ref 1.5–7.7)
NEUTROPHILS NFR BLD AUTO: 57.6 %
NONHDLC SERPL-MCNC: 122 MG/DL (ref ?–130)
OSMOLALITY SERPL CALC.SUM OF ELEC: 288 MOSM/KG (ref 275–295)
PATIENT FASTING Y/N/NP: YES
PATIENT FASTING Y/N/NP: YES
PLATELET # BLD AUTO: 296 10(3)UL (ref 150–450)
POTASSIUM SERPL-SCNC: 4.2 MMOL/L (ref 3.5–5.1)
RBC # BLD AUTO: 4.61 X10(6)UL (ref 3.8–5.3)
SODIUM SERPL-SCNC: 139 MMOL/L (ref 136–145)
TRIGL SERPL-MCNC: 60 MG/DL (ref 30–149)
VIT D+METAB SERPL-MCNC: 18 NG/ML (ref 30–100)
VLDLC SERPL CALC-MCNC: 12 MG/DL (ref 0–30)
WBC # BLD AUTO: 6.7 X10(3) UL (ref 4–11)

## 2020-07-17 PROCEDURE — 80061 LIPID PANEL: CPT

## 2020-07-17 PROCEDURE — 80053 COMPREHEN METABOLIC PANEL: CPT

## 2020-07-17 PROCEDURE — 36415 COLL VENOUS BLD VENIPUNCTURE: CPT

## 2020-07-17 PROCEDURE — 82306 VITAMIN D 25 HYDROXY: CPT

## 2020-07-17 PROCEDURE — 85025 COMPLETE CBC W/AUTO DIFF WBC: CPT

## 2020-07-17 PROCEDURE — 73564 X-RAY EXAM KNEE 4 OR MORE: CPT | Performed by: FAMILY MEDICINE

## 2020-07-21 ENCOUNTER — OFFICE VISIT (OUTPATIENT)
Dept: FAMILY MEDICINE CLINIC | Facility: CLINIC | Age: 50
End: 2020-07-21

## 2020-07-21 VITALS
DIASTOLIC BLOOD PRESSURE: 70 MMHG | TEMPERATURE: 98 F | WEIGHT: 129 LBS | HEART RATE: 80 BPM | OXYGEN SATURATION: 98 % | SYSTOLIC BLOOD PRESSURE: 124 MMHG | HEIGHT: 60 IN | RESPIRATION RATE: 18 BRPM | BODY MASS INDEX: 25.32 KG/M2

## 2020-07-21 DIAGNOSIS — M17.11 PRIMARY OSTEOARTHRITIS OF RIGHT KNEE: ICD-10-CM

## 2020-07-21 DIAGNOSIS — E55.9 VITAMIN D DEFICIENCY: ICD-10-CM

## 2020-07-21 DIAGNOSIS — E78.00 ELEVATED CHOLESTEROL: Primary | ICD-10-CM

## 2020-07-21 DIAGNOSIS — G35 MS (MULTIPLE SCLEROSIS) (HCC): ICD-10-CM

## 2020-07-21 PROCEDURE — 3074F SYST BP LT 130 MM HG: CPT | Performed by: FAMILY MEDICINE

## 2020-07-21 PROCEDURE — 3008F BODY MASS INDEX DOCD: CPT | Performed by: FAMILY MEDICINE

## 2020-07-21 PROCEDURE — 3078F DIAST BP <80 MM HG: CPT | Performed by: FAMILY MEDICINE

## 2020-07-21 PROCEDURE — 99213 OFFICE O/P EST LOW 20 MIN: CPT | Performed by: FAMILY MEDICINE

## 2020-07-21 NOTE — PROGRESS NOTES
HPI:   Jimmy Rowell is a 52year old female who presents for lab and x-ray follow up       Component      Latest Ref Rng & Units 7/17/2020 7/17/2020 12/27/2019 12/27/2019           7:01 AM  7:01 AM  8:16 AM  8:16 AM   Cholesterol, Total      <200 mg/d seed oil 1 teaspoon twice a day     • Multiple Vitamins-Minerals (WOMENS ONE DAILY OR) Take by mouth daily.         Past Medical History:   Diagnosis Date   • Arthritis    • Back pain    • Bloating    • Constipation    • Fibroids    • Frequent use of laxati distress  CARDIO: RRR without murmur  LUNGS: clear to auscultation  NECK: supple,no adenopathy,no thyromegaly  UE.5+ strength 2+ DTR's normal sensation   HEENT: atraumatic, normocephalic,ears and throat are clear  EYES:PERRLA, EOMI, normal,conjunctiva are

## 2020-09-18 ENCOUNTER — TELEPHONE (OUTPATIENT)
Dept: FAMILY MEDICINE CLINIC | Facility: CLINIC | Age: 50
End: 2020-09-18

## 2020-09-18 ENCOUNTER — OFFICE VISIT (OUTPATIENT)
Dept: FAMILY MEDICINE CLINIC | Facility: CLINIC | Age: 50
End: 2020-09-18

## 2020-09-18 VITALS
SYSTOLIC BLOOD PRESSURE: 106 MMHG | WEIGHT: 131 LBS | HEART RATE: 70 BPM | DIASTOLIC BLOOD PRESSURE: 68 MMHG | HEIGHT: 60 IN | TEMPERATURE: 98 F | RESPIRATION RATE: 16 BRPM | BODY MASS INDEX: 25.72 KG/M2 | OXYGEN SATURATION: 98 %

## 2020-09-18 DIAGNOSIS — Z82.49 FAMILY HISTORY OF EARLY CAD: ICD-10-CM

## 2020-09-18 DIAGNOSIS — G35 MS (MULTIPLE SCLEROSIS) (HCC): ICD-10-CM

## 2020-09-18 DIAGNOSIS — R07.89 OTHER CHEST PAIN: Primary | ICD-10-CM

## 2020-09-18 PROCEDURE — 3078F DIAST BP <80 MM HG: CPT | Performed by: FAMILY MEDICINE

## 2020-09-18 PROCEDURE — 99214 OFFICE O/P EST MOD 30 MIN: CPT | Performed by: FAMILY MEDICINE

## 2020-09-18 PROCEDURE — 3008F BODY MASS INDEX DOCD: CPT | Performed by: FAMILY MEDICINE

## 2020-09-18 PROCEDURE — 3074F SYST BP LT 130 MM HG: CPT | Performed by: FAMILY MEDICINE

## 2020-09-18 NOTE — TELEPHONE ENCOUNTER
Patient states that Dr. Angelo Links' office needs recent blood work, ekg and last 3 office visit notes sent to doctors office.     Fax #[phone]  Phone [phone]

## 2020-09-18 NOTE — PROGRESS NOTES
HPI:   Shreyl Reddy is a 48year old female who presents with chest pain     Pt reports it comes and goes   It started in February   Pt has h/o MS and has not seen neuro recently   Pt started to have mid line chest pain sharp 9/10 pain   Intermittent • Hypertension Mother    • Breast Cancer Mother 64      Social History:   Social History    Tobacco Use      Smoking status: Never Smoker      Smokeless tobacco: Never Used    Alcohol use: No      Alcohol/week: 0.0 standard drinks    Drug use: No    Occ: [facility] [employer] [facility])  Discussed need for neuro eval and possible MS involvement of chest pian if cards work up negative       Questions answered and patient indicates understanding of these issues and agrees to the plan. Follow up in 1 mo or sooner if needed .

## 2020-09-23 ENCOUNTER — V-VISIT (OUTPATIENT)
Dept: CARDIOLOGY | Age: 50
End: 2020-09-23

## 2020-09-23 ENCOUNTER — TELEPHONE (OUTPATIENT)
Dept: CARDIOLOGY | Age: 50
End: 2020-09-23

## 2020-09-23 VITALS
HEART RATE: 70 BPM | DIASTOLIC BLOOD PRESSURE: 68 MMHG | SYSTOLIC BLOOD PRESSURE: 106 MMHG | BODY MASS INDEX: 25.72 KG/M2 | HEIGHT: 60 IN | WEIGHT: 131 LBS

## 2020-09-23 DIAGNOSIS — R53.83 MALAISE AND FATIGUE: ICD-10-CM

## 2020-09-23 DIAGNOSIS — R20.2 PARESTHESIAS: ICD-10-CM

## 2020-09-23 DIAGNOSIS — G35 MULTIPLE SCLEROSIS (CMD): Primary | ICD-10-CM

## 2020-09-23 DIAGNOSIS — E55.9 VITAMIN D DEFICIENCY: ICD-10-CM

## 2020-09-23 DIAGNOSIS — R06.02 SHORTNESS OF BREATH: ICD-10-CM

## 2020-09-23 DIAGNOSIS — R53.81 MALAISE AND FATIGUE: ICD-10-CM

## 2020-09-23 DIAGNOSIS — R07.2 PRECORDIAL PAIN: ICD-10-CM

## 2020-09-23 DIAGNOSIS — R05.9 COUGH: ICD-10-CM

## 2020-09-23 DIAGNOSIS — Z20.822 EXPOSURE TO COVID-19 VIRUS: ICD-10-CM

## 2020-09-23 PROCEDURE — 99245 OFF/OP CONSLTJ NEW/EST HI 55: CPT | Performed by: INTERNAL MEDICINE

## 2020-09-23 SDOH — HEALTH STABILITY: PHYSICAL HEALTH: ON AVERAGE, HOW MANY DAYS PER WEEK DO YOU ENGAGE IN MODERATE TO STRENUOUS EXERCISE (LIKE A BRISK WALK)?: 7 DAYS

## 2020-09-23 SDOH — HEALTH STABILITY: PHYSICAL HEALTH: ON AVERAGE, HOW MANY MINUTES DO YOU ENGAGE IN EXERCISE AT THIS LEVEL?: 30 MIN

## 2020-09-23 ASSESSMENT — ENCOUNTER SYMPTOMS
ALLERGIC/IMMUNOLOGIC COMMENTS: NO NEW FOOD ALLERGIES
WEIGHT GAIN: 0
WEIGHT LOSS: 0
SUSPICIOUS LESIONS: 0
HEMOPTYSIS: 0
HEMATOCHEZIA: 0
CHILLS: 0
BRUISES/BLEEDS EASILY: 0
FEVER: 0
COUGH: 0

## 2020-09-23 ASSESSMENT — PATIENT HEALTH QUESTIONNAIRE - PHQ9
1. LITTLE INTEREST OR PLEASURE IN DOING THINGS: NOT AT ALL
SUM OF ALL RESPONSES TO PHQ9 QUESTIONS 1 AND 2: 0
CLINICAL INTERPRETATION OF PHQ9 SCORE: NO FURTHER SCREENING NEEDED
CLINICAL INTERPRETATION OF PHQ2 SCORE: NO FURTHER SCREENING NEEDED
2. FEELING DOWN, DEPRESSED OR HOPELESS: NOT AT ALL
SUM OF ALL RESPONSES TO PHQ9 QUESTIONS 1 AND 2: 0

## 2020-09-29 ENCOUNTER — ORDER TRANSCRIPTION (OUTPATIENT)
Dept: ADMINISTRATIVE | Facility: HOSPITAL | Age: 50
End: 2020-09-29

## 2020-09-29 DIAGNOSIS — R05.9 COUGH: Primary | ICD-10-CM

## 2020-09-29 DIAGNOSIS — R53.83 FATIGUE: ICD-10-CM

## 2020-09-29 DIAGNOSIS — Z20.828 EXPOSURE TO SARS-ASSOCIATED CORONAVIRUS: ICD-10-CM

## 2020-09-29 DIAGNOSIS — R53.81 MALAISE AND FATIGUE: ICD-10-CM

## 2020-09-29 DIAGNOSIS — G35 MULTIPLE SCLEROSIS (HCC): ICD-10-CM

## 2020-09-29 DIAGNOSIS — R53.83 MALAISE AND FATIGUE: ICD-10-CM

## 2020-09-29 DIAGNOSIS — R06.02 SOB (SHORTNESS OF BREATH): Primary | ICD-10-CM

## 2020-09-29 DIAGNOSIS — R07.2 PRECORDIAL PAIN: ICD-10-CM

## 2020-10-13 ENCOUNTER — HOSPITAL ENCOUNTER (OUTPATIENT)
Dept: CV DIAGNOSTICS | Age: 50
Discharge: HOME | End: 2020-10-13
Attending: INTERNAL MEDICINE
Payer: COMMERCIAL

## 2020-10-13 DIAGNOSIS — R07.2 PRECORDIAL PAIN: ICD-10-CM

## 2020-10-13 DIAGNOSIS — R53.81 MALAISE AND FATIGUE: ICD-10-CM

## 2020-10-13 DIAGNOSIS — R53.83 MALAISE AND FATIGUE: ICD-10-CM

## 2020-10-13 DIAGNOSIS — G35 MULTIPLE SCLEROSIS (HCC): ICD-10-CM

## 2020-10-13 PROCEDURE — 93306 TTE W/DOPPLER COMPLETE: CPT | Performed by: INTERNAL MEDICINE

## 2020-10-14 ENCOUNTER — TELEPHONE (OUTPATIENT)
Dept: CARDIOLOGY | Age: 50
End: 2020-10-14

## 2020-11-13 RX ORDER — HYDROCORTISONE 25 MG/G
1 CREAM TOPICAL 2 TIMES DAILY
Qty: 1 TUBE | Refills: 0 | Status: SHIPPED | OUTPATIENT
Start: 2020-11-13 | End: 2021-11-15

## 2020-11-13 NOTE — TELEPHONE ENCOUNTER
Refill request hydrocortisone (PROCTOZONE-HC) 2.5 % Rectal Cream:  Last OV 9/18/2020. Last refill 2/20/2020. No future appt, cancelled appt on 11/10/2020.   Approve/deny:

## 2020-12-22 ENCOUNTER — OFFICE VISIT (OUTPATIENT)
Dept: FAMILY MEDICINE CLINIC | Facility: CLINIC | Age: 50
End: 2020-12-22

## 2020-12-22 VITALS
WEIGHT: 130 LBS | HEIGHT: 60 IN | DIASTOLIC BLOOD PRESSURE: 80 MMHG | SYSTOLIC BLOOD PRESSURE: 126 MMHG | TEMPERATURE: 98 F | RESPIRATION RATE: 18 BRPM | HEART RATE: 78 BPM | BODY MASS INDEX: 25.52 KG/M2 | OXYGEN SATURATION: 98 %

## 2020-12-22 DIAGNOSIS — K59.09 CHRONIC CONSTIPATION: ICD-10-CM

## 2020-12-22 DIAGNOSIS — G35 MS (MULTIPLE SCLEROSIS) (HCC): ICD-10-CM

## 2020-12-22 DIAGNOSIS — S76.211A INGUINAL STRAIN, RIGHT, INITIAL ENCOUNTER: Primary | ICD-10-CM

## 2020-12-22 DIAGNOSIS — R10.2 PELVIC PAIN: ICD-10-CM

## 2020-12-22 PROCEDURE — 3079F DIAST BP 80-89 MM HG: CPT | Performed by: FAMILY MEDICINE

## 2020-12-22 PROCEDURE — 99214 OFFICE O/P EST MOD 30 MIN: CPT | Performed by: FAMILY MEDICINE

## 2020-12-22 PROCEDURE — 3074F SYST BP LT 130 MM HG: CPT | Performed by: FAMILY MEDICINE

## 2020-12-22 PROCEDURE — 3008F BODY MASS INDEX DOCD: CPT | Performed by: FAMILY MEDICINE

## 2020-12-22 NOTE — PROGRESS NOTES
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 : . Children: .    Exercise: minimal.  Diet: watches minimally     REVIEW OF SYSTEMS:   GENERAL: feels well otherwise  SKIN: denies any unusual sk declines gi eval    4. Pelvic pain    - URINALYSIS, ROUTINE; Future  - URINE CULTURE, ROUTINE; Future      Questions answered and patient indicates understanding of these issues and agrees to the plan. Follow up in 1 mo or sooner if needed .

## 2020-12-29 ENCOUNTER — LAB ENCOUNTER (OUTPATIENT)
Dept: LAB | Age: 50
End: 2020-12-29
Attending: FAMILY MEDICINE
Payer: COMMERCIAL

## 2020-12-29 DIAGNOSIS — R10.2 PELVIC PAIN: ICD-10-CM

## 2020-12-29 PROCEDURE — 87086 URINE CULTURE/COLONY COUNT: CPT

## 2020-12-29 PROCEDURE — 81001 URINALYSIS AUTO W/SCOPE: CPT

## 2021-01-04 ENCOUNTER — TELEPHONE (OUTPATIENT)
Dept: FAMILY MEDICINE CLINIC | Facility: CLINIC | Age: 51
End: 2021-01-04

## 2021-01-04 DIAGNOSIS — Z12.31 ENCOUNTER FOR SCREENING MAMMOGRAM FOR HIGH-RISK PATIENT: Primary | ICD-10-CM

## 2021-01-04 NOTE — TELEPHONE ENCOUNTER
1/18/2020:   RECOMMENDATIONS:    ROUTINE MAMMOGRAM AND CLINICAL EVALUATION IN 12 MONTHS. Mammogram ordered. Patient notified verbalized understanding.

## 2021-01-12 NOTE — TELEPHONE ENCOUNTER
Pt thinks the Avonex medication gave her hives on her neck and chest. She's had the hives since Thursday. Pt asking if  could prescribe Fluticasone Propionate (cream) for the hives.  When pt was on this medication 2 years ago she broke out in hives an Render Risk Assessment In Note?: no Detail Level: Simple Additional Notes: Discussed miradri and Botox injections. Referred to [facility]. Patient currently taking 1 glycopyrrolate tablet a day.

## 2021-01-22 ENCOUNTER — TELEPHONE (OUTPATIENT)
Dept: FAMILY MEDICINE CLINIC | Facility: CLINIC | Age: 51
End: 2021-01-22

## 2021-01-22 DIAGNOSIS — G35 MS (MULTIPLE SCLEROSIS) (HCC): Primary | ICD-10-CM

## 2021-01-22 NOTE — TELEPHONE ENCOUNTER
Annie George  [facility]   Cc: [facility] Referral [facility]   Phone Number: [phone]      Yina Duran      . Reason for the order/referral:NEUROLOGY/F/UP   PCP: RACHEL   Refer to Provider Saran Andrew   Specialty:NEURPLOGY   Patient Insurance: Payor: BLUE ADVANTAGE HMO / Plan:  BA / Product Type: HMO /   Duration/Summary of Symptoms: ONGOING   Is this a result of an injury: (Y/N)N   Location of pain: [place]   Has the patient been seen by their PCP for this condition: (Y/N)Y   If \"Y\", date patient last seen for this condition:   DX Code:G35 (ICD-10-CM) - 340 (ICD-9-CM) - [facility] ([facility]     CPT Code for visit:OFFICE VISIT   Number of visits requested:3   If Requesting Out of Network Provider, Please provide reason:   Patient Call Back Number:[phone]

## 2021-01-23 ENCOUNTER — HOSPITAL ENCOUNTER (OUTPATIENT)
Dept: MAMMOGRAPHY | Age: 51
Discharge: HOME | End: 2021-01-23
Attending: FAMILY MEDICINE
Payer: COMMERCIAL

## 2021-01-23 DIAGNOSIS — Z12.31 ENCOUNTER FOR SCREENING MAMMOGRAM FOR HIGH-RISK PATIENT: ICD-10-CM

## 2021-01-23 PROCEDURE — 77063 BREAST TOMOSYNTHESIS BI: CPT | Performed by: FAMILY MEDICINE

## 2021-01-23 PROCEDURE — 77067 SCR MAMMO BI INCL CAD: CPT | Performed by: FAMILY MEDICINE

## 2021-04-01 ENCOUNTER — OFFICE VISIT (OUTPATIENT)
Dept: FAMILY MEDICINE CLINIC | Facility: CLINIC | Age: 51
End: 2021-04-01

## 2021-04-01 ENCOUNTER — OFFICE VISIT (OUTPATIENT)
Dept: NEUROLOGY | Facility: CLINIC | Age: 51
End: 2021-04-01

## 2021-04-01 VITALS
DIASTOLIC BLOOD PRESSURE: 62 MMHG | SYSTOLIC BLOOD PRESSURE: 118 MMHG | HEIGHT: 60 IN | RESPIRATION RATE: 14 BRPM | HEART RATE: 78 BPM | BODY MASS INDEX: 25.52 KG/M2 | WEIGHT: 130 LBS

## 2021-04-01 VITALS
BODY MASS INDEX: 25.52 KG/M2 | HEIGHT: 60 IN | DIASTOLIC BLOOD PRESSURE: 76 MMHG | TEMPERATURE: 98 F | HEART RATE: 88 BPM | RESPIRATION RATE: 18 BRPM | WEIGHT: 130 LBS | OXYGEN SATURATION: 98 % | SYSTOLIC BLOOD PRESSURE: 122 MMHG

## 2021-04-01 DIAGNOSIS — R92.2 DENSE BREAST TISSUE ON MAMMOGRAM: Primary | ICD-10-CM

## 2021-04-01 DIAGNOSIS — R10.11 RUQ PAIN: ICD-10-CM

## 2021-04-01 DIAGNOSIS — G35 MS (MULTIPLE SCLEROSIS) (HCC): Primary | ICD-10-CM

## 2021-04-01 DIAGNOSIS — R15.9 INCONTINENCE OF FECES, UNSPECIFIED FECAL INCONTINENCE TYPE: ICD-10-CM

## 2021-04-01 PROCEDURE — 3074F SYST BP LT 130 MM HG: CPT | Performed by: FAMILY MEDICINE

## 2021-04-01 PROCEDURE — 3078F DIAST BP <80 MM HG: CPT | Performed by: OTHER

## 2021-04-01 PROCEDURE — 99213 OFFICE O/P EST LOW 20 MIN: CPT | Performed by: OTHER

## 2021-04-01 PROCEDURE — 3008F BODY MASS INDEX DOCD: CPT | Performed by: OTHER

## 2021-04-01 PROCEDURE — 3008F BODY MASS INDEX DOCD: CPT | Performed by: FAMILY MEDICINE

## 2021-04-01 PROCEDURE — 3078F DIAST BP <80 MM HG: CPT | Performed by: FAMILY MEDICINE

## 2021-04-01 PROCEDURE — 3074F SYST BP LT 130 MM HG: CPT | Performed by: OTHER

## 2021-04-01 PROCEDURE — 99214 OFFICE O/P EST MOD 30 MIN: CPT | Performed by: FAMILY MEDICINE

## 2021-04-01 NOTE — PROGRESS NOTES
HPI:   Jennie Poe is a 48year old female who presents for follow up on mammogram and rectal concerns and abd pain     Dense breast tissue noted on 2020 and 2021 mammogram   Discussed additional mammogram   Mom with beast cancer     Pt reports some History:   Social History    Tobacco Use      Smoking status: Never Smoker      Smokeless tobacco: Never Used    Vaping Use      Vaping Use: Never used    Alcohol use: No      Alcohol/week: 0.0 standard drinks    Drug use: No    Occ: . : .  Children: high fat and correlation to pain  - US ABDOMEN COMPLETE (CPT=76700); Future    3.  Incontinence of feces, unspecified fecal incontinence type  Pt declines PT / monitor       Questions answered and patient indicates understanding of these issues and agrees t

## 2021-04-01 NOTE — PATIENT INSTRUCTIONS
After your visit at the [facility] office  today, please direct any follow up questions or medication needs to the staff in our [place] office so that your concerns may be promptly addressed.   We are available through [employer] or at the numbers below: be picked up in office. • Please allow the office 2-3 business days to fill the prescription. • Patient must present photo ID at time of . PLEASE NOTE: PRESCRIPTIONS MUST BE PICKED UP PRIOR TO 3:00PM MONDAY-FRIDAY    Scheduling Tests:     If your submitting forms to office staff. • Form completion may require an additional fee. • A signed Release of Information (FREDDY) must be on file before forms may be submitted. When dropping off forms, please ask the  for this paper.    • Failure

## 2021-04-01 NOTE — PROGRESS NOTES
HPI:    Patient ID: Christopher Gutierrez is a 48year old female. Multiple Sclerosis  Pertinent negatives include no numbness or weakness. Callie  is a 48year old female who presented for follow up for Multiple sclerosis.  She is doing well a Negative. Skin: Negative. Allergic/Immunologic: Negative. Neurological: Negative. Negative for facial asymmetry, weakness and numbness. Hematological: Negative. Psychiatric/Behavioral: Negative.   Negative for behavioral problems, decreased c Intact finger to nose and heel to shin test. Normal rapid alternating movements. Gait: Normal based.  Mild difficulty with tandem walk          ASSESSMENT/PLAN:   (G35) MS (multiple sclerosis) ([facility]  (primary encounter diagnosis)    Clinically stable, bin

## 2021-04-02 ENCOUNTER — TELEPHONE (OUTPATIENT)
Dept: NEUROLOGY | Facility: CLINIC | Age: 51
End: 2021-04-02

## 2021-04-02 NOTE — TELEPHONE ENCOUNTER
Patient brought paperwork with her to [facility]. Provider completed and signed paperwork. Patient signed FREDDY. Copy of paperwork placed in the scan file. Faxed paperwork to number on form with fax confirmation.

## 2021-04-05 ENCOUNTER — TELEPHONE (OUTPATIENT)
Dept: NEUROLOGY | Facility: CLINIC | Age: 51
End: 2021-04-05

## 2021-04-05 NOTE — TELEPHONE ENCOUNTER
Letter signed by provider, faxed to number provided, confirmation received.   Sent notification to Aubre[place]

## 2021-04-05 NOTE — TELEPHONE ENCOUNTER
[employer] needs a note on letterhead that patient has no driving restrictions due to her medical condition.  Fax [phone]    Per L.O.V. 4/1/2021:     ASSESSMENT/PLAN:   (G35) [facility]  (primary encounter diagnosis)     Clinically

## 2021-04-09 NOTE — TELEPHONE ENCOUNTER
Patient called indicating that Letter was supposed to be faxed to her work but they stil lhaven't received. Ask patient to confirm fax number  Was given [phone] which is not the same number we received the first time.  Informed patient that I would re

## 2021-05-18 ENCOUNTER — OFFICE VISIT (OUTPATIENT)
Dept: FAMILY MEDICINE CLINIC | Facility: CLINIC | Age: 51
End: 2021-05-18

## 2021-05-18 VITALS
TEMPERATURE: 98 F | WEIGHT: 135 LBS | HEIGHT: 60 IN | SYSTOLIC BLOOD PRESSURE: 110 MMHG | DIASTOLIC BLOOD PRESSURE: 72 MMHG | HEART RATE: 96 BPM | BODY MASS INDEX: 26.5 KG/M2

## 2021-05-18 DIAGNOSIS — L74.0 HEAT RASH: ICD-10-CM

## 2021-05-18 DIAGNOSIS — M17.11 PRIMARY OSTEOARTHRITIS OF RIGHT KNEE: Primary | ICD-10-CM

## 2021-05-18 PROCEDURE — 3078F DIAST BP <80 MM HG: CPT | Performed by: INTERNAL MEDICINE

## 2021-05-18 PROCEDURE — 3008F BODY MASS INDEX DOCD: CPT | Performed by: INTERNAL MEDICINE

## 2021-05-18 PROCEDURE — 3074F SYST BP LT 130 MM HG: CPT | Performed by: INTERNAL MEDICINE

## 2021-05-18 PROCEDURE — 99214 OFFICE O/P EST MOD 30 MIN: CPT | Performed by: INTERNAL MEDICINE

## 2021-05-18 RX ORDER — NAPROXEN 500 MG/1
500 TABLET ORAL 2 TIMES DAILY PRN
Qty: 30 TABLET | Refills: 0 | Status: SHIPPED | OUTPATIENT
Start: 2021-05-18

## 2021-05-18 RX ORDER — FLUTICASONE PROPIONATE 0.05 %
CREAM (GRAM) TOPICAL
Qty: 30 G | Refills: 0 | Status: SHIPPED | OUTPATIENT
Start: 2021-05-18

## 2021-05-18 NOTE — PROGRESS NOTES
Adelaide Shepard is a 48year old female. HPI:   Here with new acute right knee pain for 1 week, started Mothers day weekend. Maybe more salt than usual?  Standing on her feet a lot lately also.   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

## 2021-06-05 ENCOUNTER — HOSPITAL ENCOUNTER (OUTPATIENT)
Dept: ULTRASOUND IMAGING | Age: 51
Discharge: HOME | End: 2021-06-05
Attending: FAMILY MEDICINE
Payer: COMMERCIAL

## 2021-06-05 DIAGNOSIS — R10.11 RUQ PAIN: ICD-10-CM

## 2021-06-05 PROCEDURE — 76700 US EXAM ABDOM COMPLETE: CPT | Performed by: FAMILY MEDICINE

## 2021-06-11 ENCOUNTER — HOSPITAL ENCOUNTER (OUTPATIENT)
Dept: MAMMOGRAPHY | Facility: HOSPITAL | Age: 51
Discharge: HOME | End: 2021-06-11
Attending: FAMILY MEDICINE
Payer: COMMERCIAL

## 2021-06-11 DIAGNOSIS — R92.2 DENSE BREAST TISSUE ON MAMMOGRAM: ICD-10-CM

## 2021-06-11 PROCEDURE — 76641 ULTRASOUND BREAST COMPLETE: CPT | Performed by: FAMILY MEDICINE

## 2021-07-22 ENCOUNTER — HOSPITAL ENCOUNTER (OUTPATIENT)
Dept: GENERAL RADIOLOGY | Age: 51
Discharge: HOME | End: 2021-07-22
Attending: FAMILY MEDICINE
Payer: COMMERCIAL

## 2021-07-22 ENCOUNTER — OFFICE VISIT (OUTPATIENT)
Dept: FAMILY MEDICINE CLINIC | Facility: CLINIC | Age: 51
End: 2021-07-22

## 2021-07-22 VITALS
HEIGHT: 60 IN | HEART RATE: 90 BPM | RESPIRATION RATE: 16 BRPM | OXYGEN SATURATION: 98 % | DIASTOLIC BLOOD PRESSURE: 64 MMHG | WEIGHT: 134 LBS | BODY MASS INDEX: 26.31 KG/M2 | SYSTOLIC BLOOD PRESSURE: 106 MMHG

## 2021-07-22 DIAGNOSIS — K30 INDIGESTION: ICD-10-CM

## 2021-07-22 DIAGNOSIS — K59.09 CHRONIC CONSTIPATION: ICD-10-CM

## 2021-07-22 DIAGNOSIS — G89.29 CHRONIC PAIN OF RIGHT KNEE: ICD-10-CM

## 2021-07-22 DIAGNOSIS — K59.09 CHRONIC CONSTIPATION: Primary | ICD-10-CM

## 2021-07-22 DIAGNOSIS — M25.469 KNEE SWELLING: ICD-10-CM

## 2021-07-22 DIAGNOSIS — M25.561 CHRONIC PAIN OF RIGHT KNEE: ICD-10-CM

## 2021-07-22 PROCEDURE — 99214 OFFICE O/P EST MOD 30 MIN: CPT | Performed by: FAMILY MEDICINE

## 2021-07-22 PROCEDURE — 3008F BODY MASS INDEX DOCD: CPT | Performed by: FAMILY MEDICINE

## 2021-07-22 PROCEDURE — 3074F SYST BP LT 130 MM HG: CPT | Performed by: FAMILY MEDICINE

## 2021-07-22 PROCEDURE — 74018 RADEX ABDOMEN 1 VIEW: CPT | Performed by: FAMILY MEDICINE

## 2021-07-22 PROCEDURE — 3078F DIAST BP <80 MM HG: CPT | Performed by: FAMILY MEDICINE

## 2021-07-22 RX ORDER — OMEPRAZOLE 40 MG/1
40 CAPSULE, DELAYED RELEASE ORAL DAILY
Qty: 30 CAPSULE | Refills: 2 | Status: SHIPPED | OUTPATIENT
Start: 2021-07-22

## 2021-07-22 NOTE — PROGRESS NOTES
HPI:   Moreno Ontiveros is a 48year old female who presents for follow up on chronic right knee pain, gi concerns and breast tenderness       Right knee swelling since mother's day   No injury or fall  Pt has been dealing with swelling of right knee  X-r Tobacco Use      Smoking status: Never Smoker      Smokeless tobacco: Never Used    Vaping Use      Vaping Use: Never used    Alcohol use: No      Alcohol/week: 0.0 standard drinks    Drug use: No    Occ: . : . Children: .    Exercise: minimal.  Diet (CPT=74018); Future    3. Chronic pain of right knee    - ORTHOPEDIC - INTERNAL    4. Knee swelling    - ORTHOPEDIC - INTERNAL    Questions answered and patient indicates understanding of these issues and agrees to the plan.   Follow up in 2-3 mo or sooner

## 2021-07-23 ENCOUNTER — TELEPHONE (OUTPATIENT)
Dept: ORTHOPEDICS CLINIC | Facility: CLINIC | Age: 51
End: 2021-07-23

## 2021-07-23 NOTE — TELEPHONE ENCOUNTER
Patient is scheduled on 8/11/2021 with Dr. Sharmila Monge for right knee pain. Please advise if x-rays are needed.

## 2021-07-23 NOTE — TELEPHONE ENCOUNTER
CONCLUSION:    Osteoarthritic changes are seen, medial and patellofemoral compartment with osteophytes, mild joint narrowing.  No fracture or subluxation or dislocation.  No joint effusion or destructive process. OA seen on pt's xrays from 07/2020.  No

## 2021-08-11 ENCOUNTER — OFFICE VISIT (OUTPATIENT)
Dept: ORTHOPEDICS CLINIC | Facility: CLINIC | Age: 51
End: 2021-08-11

## 2021-08-11 VITALS — WEIGHT: 135.38 LBS | HEART RATE: 82 BPM | HEIGHT: 61 IN | BODY MASS INDEX: 25.56 KG/M2 | OXYGEN SATURATION: 97 %

## 2021-08-11 DIAGNOSIS — M17.11 PRIMARY OSTEOARTHRITIS OF RIGHT KNEE: Primary | ICD-10-CM

## 2021-08-11 PROCEDURE — 99203 OFFICE O/P NEW LOW 30 MIN: CPT | Performed by: ORTHOPAEDIC SURGERY

## 2021-08-11 PROCEDURE — 3008F BODY MASS INDEX DOCD: CPT | Performed by: ORTHOPAEDIC SURGERY

## 2021-08-11 NOTE — H&P
[facility] New Patient Note    CC: Patient presents with:  Knee Pain: right knee pain       HPI: This 48year old female presents today with complaints of right knee pain. Patient states this is been going on since of Mother's Day.   She reports pain Nutritional Supplements (ADULT NUTRITIONAL SUPPLEMENT OR) Take by mouth. Black seed oil 1 teaspoon twice a day     • Multiple Vitamins-Minerals (WOMENS ONE DAILY OR) Take by mouth daily.          Sulfamethoxazole W/*    SWELLING, SHORTNESS OF BREATH, and all orders for this visit:    Primary osteoarthritis of right knee  -     OP REFERRAL TO [facility]      Assessment: 25-year-old female with symptomatic radiographically mild to moderate right knee osteoarthritis    Plan: I discuss

## 2021-09-08 ENCOUNTER — OFFICE VISIT (OUTPATIENT)
Dept: FAMILY MEDICINE CLINIC | Facility: CLINIC | Age: 51
End: 2021-09-08

## 2021-09-08 VITALS
DIASTOLIC BLOOD PRESSURE: 75 MMHG | HEIGHT: 60 IN | OXYGEN SATURATION: 98 % | SYSTOLIC BLOOD PRESSURE: 123 MMHG | WEIGHT: 130 LBS | TEMPERATURE: 98 F | HEART RATE: 97 BPM | RESPIRATION RATE: 20 BRPM | BODY MASS INDEX: 25.52 KG/M2

## 2021-09-08 DIAGNOSIS — R10.2 SUPRAPUBIC PRESSURE: ICD-10-CM

## 2021-09-08 DIAGNOSIS — Z02.9 ADMINISTRATIVE ENCOUNTER: Primary | ICD-10-CM

## 2021-09-08 DIAGNOSIS — T63.441A BEE STING, ACCIDENTAL OR UNINTENTIONAL, INITIAL ENCOUNTER: ICD-10-CM

## 2021-09-08 LAB
APPEARANCE: CLEAR
BILIRUBIN: NEGATIVE
GLUCOSE (URINE DIPSTICK): NEGATIVE MG/DL
KETONES (URINE DIPSTICK): NEGATIVE MG/DL
LEUKOCYTES: NEGATIVE
MULTISTIX LOT#: ABNORMAL NUMERIC
NITRITE, URINE: NEGATIVE
PH, URINE: 6 (ref 4.5–8)
PROTEIN (URINE DIPSTICK): NEGATIVE MG/DL
SPECIFIC GRAVITY: 1.03 (ref 1–1.03)
URINE-COLOR: YELLOW
UROBILINOGEN,SEMI-QN: 0.2 MG/DL (ref 0–1.9)

## 2021-09-08 PROCEDURE — 3078F DIAST BP <80 MM HG: CPT | Performed by: NURSE PRACTITIONER

## 2021-09-08 PROCEDURE — 3074F SYST BP LT 130 MM HG: CPT | Performed by: NURSE PRACTITIONER

## 2021-09-08 PROCEDURE — 81003 URINALYSIS AUTO W/O SCOPE: CPT | Performed by: NURSE PRACTITIONER

## 2021-09-08 PROCEDURE — 3008F BODY MASS INDEX DOCD: CPT | Performed by: NURSE PRACTITIONER

## 2021-09-08 NOTE — PROGRESS NOTES
CHIEF COMPLAINT:   Patient presents with:  Bite Sting,Insect: elbow  UTI: x 1 day      HPI:   Benjamin Vazquez is a 46year old female who presents with symptoms \"pressure in vagina\". Denies dysuria, frequency, urgency.  Patient reporting symptoms of GENERAL: See above  GI: See HPI. : See HPI.       EXAM:   /75 (BP Location: Left arm, Patient Position: Sitting)   Pulse 97   Temp 98.2 °F (36.8 °C)   Resp 20   Ht 5' (1.524 m)   Wt 130 lb (59 kg)   SpO2 98%   BMI 25.39 kg/m²   GENERAL: well de of completing full course of antibiotic unless told otherwise. The patient indicates understanding of these issues and agrees to the plan. The patient is asked to see PCP if not better.  Seek care immediately for new onset of fever, vomiting, worsening

## 2021-09-09 ENCOUNTER — HOSPITAL ENCOUNTER (EMERGENCY)
Age: 51
Discharge: HOME | End: 2021-09-09
Attending: EMERGENCY MEDICINE
Payer: COMMERCIAL

## 2021-09-09 VITALS
TEMPERATURE: 98 F | RESPIRATION RATE: 18 BRPM | DIASTOLIC BLOOD PRESSURE: 78 MMHG | HEIGHT: 60 IN | BODY MASS INDEX: 25.52 KG/M2 | OXYGEN SATURATION: 98 % | SYSTOLIC BLOOD PRESSURE: 139 MMHG | WEIGHT: 130 LBS | HEART RATE: 84 BPM

## 2021-09-09 DIAGNOSIS — S50.861A INFECTED INSECT BITE OF RIGHT FOREARM, INITIAL ENCOUNTER: Primary | ICD-10-CM

## 2021-09-09 DIAGNOSIS — R39.89 SENSATION OF PRESSURE IN BLADDER AREA: ICD-10-CM
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
## 2021-09-09 DIAGNOSIS — R82.4 URINE KETONES: ICD-10-CM

## 2021-09-09 DIAGNOSIS — L08.9 INFECTED INSECT BITE OF RIGHT FOREARM, INITIAL ENCOUNTER: Primary | ICD-10-CM

## 2021-09-09 LAB
BILIRUB UR QL STRIP.AUTO: NEGATIVE
CLARITY UR REFRACT.AUTO: CLEAR
COLOR UR AUTO: YELLOW
GLUCOSE UR STRIP.AUTO-MCNC: NEGATIVE MG/DL
LEUKOCYTE ESTERASE UR QL STRIP.AUTO: NEGATIVE
NITRITE UR QL STRIP.AUTO: NEGATIVE
PH UR STRIP.AUTO: 6 [PH] (ref 5–8)
PROT UR STRIP.AUTO-MCNC: NEGATIVE MG/DL
RBC UR QL AUTO: NEGATIVE
SP GR UR STRIP.AUTO: >=1.03 (ref 1–1.03)
UROBILINOGEN UR STRIP.AUTO-MCNC: 0.2 MG/DL

## 2021-09-09 PROCEDURE — 81003 URINALYSIS AUTO W/O SCOPE: CPT

## 2021-09-09 PROCEDURE — 99283 EMERGENCY DEPT VISIT LOW MDM: CPT

## 2021-09-09 PROCEDURE — 81003 URINALYSIS AUTO W/O SCOPE: CPT | Performed by: EMERGENCY MEDICINE

## 2021-09-09 RX ORDER — CEPHALEXIN 500 MG/1
500 CAPSULE ORAL 4 TIMES DAILY
Qty: 40 CAPSULE | Refills: 0 | Status: SHIPPED | OUTPATIENT
Start: 2021-09-09 | End: 2021-09-19

## 2021-09-09 RX ORDER — DEXAMETHASONE 4 MG/1
16 TABLET ORAL ONCE
Status: COMPLETED | OUTPATIENT
Start: 2021-09-09 | End: 2021-09-09

## 2021-09-09 RX ORDER — FAMOTIDINE 20 MG/1
20 TABLET ORAL ONCE
Status: COMPLETED | OUTPATIENT
Start: 2021-09-09 | End: 2021-09-09

## 2021-09-09 NOTE — ED INITIAL ASSESSMENT (HPI)
C/o vaginal pressure after voiding x 7 days. Pt seen at urgent care yest. Neg for urine. Pt also c/o painful and swollen bee sting to r forearm yesterday.

## 2021-09-09 NOTE — PATIENT INSTRUCTIONS
Insect, Spider, and Scorpion Bites and Stings   Most insect bites are harmless and cause only minor swelling or itching. But if you’re allergic to insects such as wasps or bees, a sting can cause a life-threatening allergic reaction.  Some ticks can carry be prescribed. Easing symptoms of an insect bite or sting  · Try to remove a stinger you can see. Use your fingernail, a knife edge, or credit card to scrape against the skin. Don't squeeze or pull.   · Apply ice or a cold compress to reduce pain and swe

## 2021-09-09 NOTE — ED PROVIDER NOTES
Patient Seen in: THE [facility] Emergency Department In [place]      History   Patient presents with:  Eval-G    Stated Complaint: vaginal pressure after voiding, seen at immediate care yesterday neg for uti    HPI/Subjective:   HPI    51-year-old female here No             Review of Systems    Positive for stated complaint: vaginal pressure after voiding, seen at immediate care yesterday neg for uti  Other systems are as noted in HPI. Constitutional and vital signs reviewed.       All other systems reviewed an ED Course     Labs Reviewed   URINALYSIS WITH CULTURE REFLEX - Abnormal; Notable for the following components:       Result Value    Ketones Urine Trace (*)     All other components within normal limits                   MDM      Clinical Impre medications    cephalexin 500 MG Oral Cap  Take 1 capsule (500 mg total) by mouth 4 (four) times daily for 10 days.   Qty: 40 capsule Refills: 0

## 2021-09-13 ENCOUNTER — TELEPHONE (OUTPATIENT)
Dept: FAMILY MEDICINE CLINIC | Facility: CLINIC | Age: 51
End: 2021-09-13

## 2021-09-13 DIAGNOSIS — R10.2 SUPRAPUBIC PRESSURE: Primary | ICD-10-CM

## 2021-09-13 DIAGNOSIS — R39.89 SENSATION OF PRESSURE IN BLADDER AREA: ICD-10-CM

## 2021-09-13 NOTE — TELEPHONE ENCOUNTER
Patient was seen in the ER on 9/9. Was given cephalexin 500 MG Oral Cap  Take 1 capsule (500 mg total) by mouth 4 (four) times daily for 10 days. Patient states that the medication is not agreeing with her. Causing stomach issues.     Would like to dis

## 2021-09-20 ENCOUNTER — TELEPHONE (OUTPATIENT)
Dept: FAMILY MEDICINE CLINIC | Facility: CLINIC | Age: 51
End: 2021-09-20

## 2021-09-20 RX ORDER — FLUCONAZOLE 150 MG/1
150 TABLET ORAL ONCE
Qty: 1 TABLET | Refills: 0 | Status: SHIPPED | OUTPATIENT
Start: 2021-09-20 | End: 2021-09-20

## 2021-09-20 NOTE — TELEPHONE ENCOUNTER
Pt was seen at [facility] for bug bite and UTI and put on antibiotics now has yeast infection. Will you rx diflucan or do you want appt?

## 2021-11-15 RX ORDER — HYDROCORTISONE 25 MG/G
1 CREAM TOPICAL 2 TIMES DAILY
Qty: 1 EACH | Refills: 0 | Status: SHIPPED | OUTPATIENT
Start: 2021-11-15

## 2022-01-11 ENCOUNTER — OFFICE VISIT (OUTPATIENT)
Dept: FAMILY MEDICINE CLINIC | Facility: CLINIC | Age: 52
End: 2022-01-11
Payer: COMMERCIAL

## 2022-01-11 ENCOUNTER — OFFICE VISIT (OUTPATIENT)
Dept: UROLOGY | Facility: HOSPITAL | Age: 52
End: 2022-01-11
Attending: OBSTETRICS & GYNECOLOGY
Payer: COMMERCIAL

## 2022-01-11 VITALS
RESPIRATION RATE: 16 BRPM | BODY MASS INDEX: 27.29 KG/M2 | DIASTOLIC BLOOD PRESSURE: 56 MMHG | HEIGHT: 60 IN | WEIGHT: 139 LBS | SYSTOLIC BLOOD PRESSURE: 110 MMHG

## 2022-01-11 VITALS — HEIGHT: 60 IN | BODY MASS INDEX: 25.52 KG/M2 | WEIGHT: 130 LBS | TEMPERATURE: 98 F

## 2022-01-11 DIAGNOSIS — N81.84 PELVIC MUSCLE WASTING: ICD-10-CM

## 2022-01-11 DIAGNOSIS — N95.2 POSTMENOPAUSAL ATROPHIC VAGINITIS: ICD-10-CM

## 2022-01-11 DIAGNOSIS — K59.00 CONSTIPATION, UNSPECIFIED CONSTIPATION TYPE: Primary | ICD-10-CM

## 2022-01-11 DIAGNOSIS — R35.0 URINE FREQUENCY: ICD-10-CM

## 2022-01-11 DIAGNOSIS — K59.00 CONSTIPATION, UNSPECIFIED CONSTIPATION TYPE: ICD-10-CM

## 2022-01-11 DIAGNOSIS — R51.9 OCCIPITAL HEADACHE: ICD-10-CM

## 2022-01-11 DIAGNOSIS — N64.4 BILATERAL MASTODYNIA: ICD-10-CM

## 2022-01-11 DIAGNOSIS — G35 MS (MULTIPLE SCLEROSIS) (HCC): Primary | ICD-10-CM

## 2022-01-11 LAB
BLOOD URINE: NEGATIVE
CONTROL RUN WITHIN 24 HOURS?: YES
LEUKOCYTE ESTERASE URINE: NEGATIVE
NITRITE URINE: NEGATIVE

## 2022-01-11 PROCEDURE — 3078F DIAST BP <80 MM HG: CPT | Performed by: FAMILY MEDICINE

## 2022-01-11 PROCEDURE — 81002 URINALYSIS NONAUTO W/O SCOPE: CPT | Performed by: OBSTETRICS & GYNECOLOGY

## 2022-01-11 PROCEDURE — 3008F BODY MASS INDEX DOCD: CPT | Performed by: FAMILY MEDICINE

## 2022-01-11 PROCEDURE — 87086 URINE CULTURE/COLONY COUNT: CPT | Performed by: OBSTETRICS & GYNECOLOGY

## 2022-01-11 PROCEDURE — 3074F SYST BP LT 130 MM HG: CPT | Performed by: FAMILY MEDICINE

## 2022-01-11 PROCEDURE — 99214 OFFICE O/P EST MOD 30 MIN: CPT | Performed by: FAMILY MEDICINE

## 2022-01-11 PROCEDURE — 99212 OFFICE O/P EST SF 10 MIN: CPT

## 2022-01-11 RX ORDER — ESTRADIOL 0.1 MG/G
CREAM VAGINAL
Qty: 42 G | Refills: 3 | Status: SHIPPED | OUTPATIENT
Start: 2022-01-11

## 2022-01-11 NOTE — PATIENT INSTRUCTIONS
[facility]   [facility]    BOWEL REGIMEN    Constipation can have detrimental effects on bladder function and can worsen the symptoms of prolapse. It is important to avoid constipation.     The first step for treating co your body). The urethra may not be as easily identified as the vagina because the opening is much smaller, however, use the diagram to determine its approximate location.     4.  Carefully spread the cream onto the external vaginal/urethral area (See figur

## 2022-01-11 NOTE — PROGRESS NOTES
HPI:   Jonah Abraham is a 46year old female who presents for follow up MS, chronic constipation and new HA's / breast tenderness     Pt c/o occipital HA  Daily or every other day for a few weeks  No vision changes   No NV  No light or sound sensitivit 4/28/2017      Past Surgical History:   Procedure Laterality Date   • EGD N/A 1/5/2017    Procedure: ESOPHAGOGASTRODUODENOSCOPY, COLONOSCOPY, POSSIBLE BIOPSY, POSSIBLE POLYPECTOMY 19736, 10979;  Surgeon: Len Jimenez MD;  Location: [place] [address] walking     ASSESSMENT AND PLAN:   Sunita Chisholm is a 46year old female who presents with     1. MS (multiple sclerosis) ([facility]    - MRI BRAIN (W+WO) (CPT=70553); Future    2. Occipital headache    - MRI BRAIN (W+WO) (CPT=70553); Future    3.  Bilateral

## 2022-01-11 NOTE — PROGRESS NOTES
Alix Lui DO  1/11/2022     Referred by Dr. Katherine Wheeler  Pt here with self    Patient presents with:  Prolapse: referred by Jude Mckeon c/o vag pressure & heaviness - episodic  RALH given fibroids in 2012    HPI:  No WILLIAM  No UUI  Nocturia x0  + pr (two) times daily. For 1-2 weeks only, Disp: 1 each, Rfl: 0  Fluticasone Propionate 0.05 % External Cream, Apply 2-3 times daily for up to 10 days, Disp: 30 g, Rfl: 0  CALCIUM-VITAMIN D OR, 600 mg daily. , Disp: , Rfl:   Nutritional Supplements (ADULT NUTRI encounter diagnosis)  Plan: OP REFERRAL TO [facility]    (N81.84) Pelvic muscle wasting  Plan: OP REFERRAL TO [facility]    (N95.2) Postmenopausal atrophic vaginitis  Plan: estradiol (ESTRACE) 0.1 MG/GM Vaginal Cre

## 2022-01-17 ENCOUNTER — HOSPITAL ENCOUNTER (OUTPATIENT)
Dept: MRI IMAGING | Age: 52
Discharge: HOME | End: 2022-01-17
Attending: FAMILY MEDICINE
Payer: COMMERCIAL

## 2022-01-17 DIAGNOSIS — G35 MS (MULTIPLE SCLEROSIS) (HCC): ICD-10-CM

## 2022-01-17 DIAGNOSIS — R51.9 OCCIPITAL HEADACHE: ICD-10-CM

## 2022-01-17 PROCEDURE — A9575 INJ GADOTERATE MEGLUMI 0.1ML: HCPCS | Performed by: FAMILY MEDICINE

## 2022-01-17 PROCEDURE — 70553 MRI BRAIN STEM W/O & W/DYE: CPT | Performed by: FAMILY MEDICINE

## 2022-01-20 ENCOUNTER — TELEPHONE (OUTPATIENT)
Dept: UROLOGY | Facility: HOSPITAL | Age: 52
End: 2022-01-20

## 2022-01-20 NOTE — TELEPHONE ENCOUNTER
Called pt regarding 2 wk follow up scheduled for next week. Dr. Jin Ingram requested 2 month PT follow up, this appt was made in error. Pt reports she is using Estrace twice weekly now as directed. Plans to schedule PT as directed.   Purchased fiber and

## 2022-02-02 ENCOUNTER — TELEPHONE (OUTPATIENT)
Dept: PHYSICAL THERAPY | Facility: HOSPITAL | Age: 52
End: 2022-02-02

## 2022-02-07 ENCOUNTER — HOSPITAL ENCOUNTER (OUTPATIENT)
Dept: MAMMOGRAPHY | Facility: HOSPITAL | Age: 52
Discharge: HOME | End: 2022-02-07
Attending: FAMILY MEDICINE
Payer: COMMERCIAL

## 2022-02-07 DIAGNOSIS — N64.4 BILATERAL MASTODYNIA: ICD-10-CM

## 2022-02-07 NOTE — IMAGING NOTE
Patient arrived for her breast imaging not feeling well. This RN called in to talk to patient. Patient c/o sinus congestion and headache and \" just wants to lay down\". Patient alert, oriented, skin warm and dry. Patient denies dizziness or any other symptoms but just \" doesn't feel right\" Patient was agreeable to rescheduling her breast imaging to another day. Patient declined going to ER and states that she is fine to drive. Patient given juice and crackers and released in stable condition. Patient was encouraged to get tested for Covid and follow up with her physician regarding her recent symptoms. Patient verbalized understanding and has no further questions at this time.

## 2022-02-08 ENCOUNTER — LAB ENCOUNTER (OUTPATIENT)
Dept: LAB | Age: 52
End: 2022-02-08
Attending: FAMILY MEDICINE
Payer: COMMERCIAL

## 2022-02-08 ENCOUNTER — TELEPHONE (OUTPATIENT)
Dept: FAMILY MEDICINE CLINIC | Facility: CLINIC | Age: 52
End: 2022-02-08

## 2022-02-08 DIAGNOSIS — Z11.52 ENCOUNTER FOR SCREENING FOR COVID-19: ICD-10-CM

## 2022-02-08 NOTE — TELEPHONE ENCOUNTER
Pt states started with fatigue on Sunday, layed in bed all day, went to work Monday as ,  Started with headache,  Took advil with some relief, cancelled her mammo appt. States she has MS and unsure if it symptoms from that. Wants to r/o covid. Test ordered. Pt notified to schedule. Advised if symptoms worsen to go to IC. Pt expressed understanding.

## 2022-02-08 NOTE — TELEPHONE ENCOUNTER
Patient calling requesting a test for covid. Has a really bad headache and is weak. She had a mammogram schd yesterday and they had to reschedule as they said that she did not look well. Patient states that she has MS and is not sure if she is having a relapse. Please advise.

## 2022-02-09 ENCOUNTER — TELEPHONE (OUTPATIENT)
Dept: FAMILY MEDICINE CLINIC | Facility: CLINIC | Age: 52
End: 2022-02-09

## 2022-02-09 LAB — SARS-COV-2 RNA RESP QL NAA+PROBE: DETECTED

## 2022-02-10 ENCOUNTER — TELEMEDICINE (OUTPATIENT)
Dept: FAMILY MEDICINE CLINIC | Facility: CLINIC | Age: 52
End: 2022-02-10
Payer: COMMERCIAL

## 2022-02-10 DIAGNOSIS — G35 MS (MULTIPLE SCLEROSIS) (HCC): ICD-10-CM

## 2022-02-10 DIAGNOSIS — J34.89 SINUS PAIN: ICD-10-CM

## 2022-02-10 DIAGNOSIS — U07.1 COVID-19 VIRUS INFECTION: Primary | ICD-10-CM

## 2022-02-10 PROCEDURE — 99214 OFFICE O/P EST MOD 30 MIN: CPT | Performed by: FAMILY MEDICINE

## 2022-02-11 RX ORDER — AZITHROMYCIN 250 MG/1
TABLET, FILM COATED ORAL
Qty: 6 TABLET | Refills: 0 | Status: SHIPPED | OUTPATIENT
Start: 2022-02-11 | End: 2022-02-16

## 2022-02-11 NOTE — PROGRESS NOTES
This visit is conducted using Telemedicine with live, interactive video and audio. Telehealth outside of [address] Verbal Consent   I conducted a telehealth visit with Ying Sheldon today, 02/10/22, which was completed using two-way, real-time interactive audio and video communication. This has been done in good joann to provide continuity of care in the best interest of the provider-patient relationship, due to the COVID -19 public health crisis/national emergency where restrictions of face-to-face office visits are ongoing. Every conscious effort was taken to allow for sufficient and adequate time to complete the visit. The patient was made aware of the limitations of the telehealth visit, including treatment limitations as no physical exam could be performed. The patient was advised to call 911 or to go to the ER in case there was an emergency. The patient was also advised of the potential privacy & security concerns related to the telehealth platform. The patient was made aware of where to find [facility] notice of privacy practices, telehealth consent form and other related consent forms and documents. which are located on the [facility] website. The patient verbally agreed to telehealth consent form, related consents and the risks discussed. Lastly, the patient confirmed that they were in [place]. Included in this visit, time may have been spent reviewing labs, medications, radiology tests and decision making. Appropriate medical decision-making and tests are ordered as detailed in the plan of care above. Coding/billing information is submitted for this visit based on complexity of care and/or time spent for the visit.     Pt here with covid     Pt has MS and works as    Pt has been sick for at least 5 days   Pt has had HA and minimal cough  Fever resolved  No sob   Not checking 02 levels   Loss in sense of taste   No diarrhea   Pt is taking vitamins and very apprehensive about going for antibody treatment     ROS otherwise negative  Past medical, surgical and social history reviewed    Exam   alert, appears stated age and cooperative, Normocephalic, without obvious abnormality, atraumatic, lips, mucosa, and tongue normal; teeth and gums normal, Speaking in full sentences comfortably, Normal work of breathing, Skin color, texture, turgor normal. No rashes or lesions and age appropriate, normal, logical connections, person, place and time/date and no suicidal ideation      A/P  Pt here with     1. COVID-19 virus infection   RECOMMENDED TO GO FOR ER EVAL DUE TO IMMUNOCOMPROMISE FOR MONOCLONAL AB TREATMENT CONSIDERATION     - azithromycin (ZITHROMAX Z-ADDISON) 250 MG Oral Tab; Take 2 tablets (500 mg total) by mouth daily for 1 day, THEN 1 tablet (250 mg total) daily for 4 days. Dispense: 6 tablet; Refill: 0    2. MS (multiple sclerosis) ([facility] [address]      3. Sinus pain    - azithromycin (ZITHROMAX Z-ADDISON) 250 MG Oral Tab; Take 2 tablets (500 mg total) by mouth daily for 1 day, THEN 1 tablet (250 mg total) daily for 4 days. Dispense: 6 tablet;  Refill: 0    TIME SPENT 30 MIN       RTC in [address] [place] [address] or sooner if needed  Questions answered and pt expressed understanding

## 2022-02-18 ENCOUNTER — APPOINTMENT (OUTPATIENT)
Dept: PHYSICAL THERAPY | Facility: HOSPITAL | Age: 52
End: 2022-02-18
Attending: FAMILY MEDICINE
Payer: COMMERCIAL

## 2022-02-23 ENCOUNTER — APPOINTMENT (OUTPATIENT)
Dept: PHYSICAL THERAPY | Facility: HOSPITAL | Age: 52
End: 2022-02-23
Attending: FAMILY MEDICINE
Payer: COMMERCIAL

## 2022-03-02 ENCOUNTER — APPOINTMENT (OUTPATIENT)
Dept: PHYSICAL THERAPY | Facility: HOSPITAL | Age: 52
End: 2022-03-02
Attending: FAMILY MEDICINE
Payer: COMMERCIAL

## 2022-03-09 ENCOUNTER — APPOINTMENT (OUTPATIENT)
Dept: PHYSICAL THERAPY | Facility: HOSPITAL | Age: 52
End: 2022-03-09
Attending: FAMILY MEDICINE
Payer: COMMERCIAL

## 2022-03-09 ENCOUNTER — TELEPHONE (OUTPATIENT)
Dept: PHYSICAL THERAPY | Facility: HOSPITAL | Age: 52
End: 2022-03-09

## 2022-03-14 ENCOUNTER — HOSPITAL ENCOUNTER (OUTPATIENT)
Dept: MAMMOGRAPHY | Age: 52
Discharge: HOME | End: 2022-03-14
Attending: FAMILY MEDICINE
Payer: COMMERCIAL

## 2022-03-14 PROCEDURE — 77066 DX MAMMO INCL CAD BI: CPT | Performed by: FAMILY MEDICINE

## 2022-03-14 PROCEDURE — 77062 BREAST TOMOSYNTHESIS BI: CPT | Performed by: FAMILY MEDICINE

## 2022-03-15 ENCOUNTER — APPOINTMENT (OUTPATIENT)
Dept: PHYSICAL THERAPY | Age: 52
End: 2022-03-15
Attending: FAMILY MEDICINE
Payer: COMMERCIAL

## 2022-03-21 ENCOUNTER — APPOINTMENT (OUTPATIENT)
Dept: PHYSICAL THERAPY | Age: 52
End: 2022-03-21
Attending: FAMILY MEDICINE
Payer: COMMERCIAL

## 2022-03-23 ENCOUNTER — APPOINTMENT (OUTPATIENT)
Dept: PHYSICAL THERAPY | Facility: HOSPITAL | Age: 52
End: 2022-03-23
Attending: FAMILY MEDICINE
Payer: COMMERCIAL

## 2022-03-28 ENCOUNTER — APPOINTMENT (OUTPATIENT)
Dept: PHYSICAL THERAPY | Age: 52
End: 2022-03-28
Attending: FAMILY MEDICINE
Payer: COMMERCIAL

## 2022-03-29 ENCOUNTER — OFFICE VISIT (OUTPATIENT)
Dept: FAMILY MEDICINE CLINIC | Facility: CLINIC | Age: 52
End: 2022-03-29
Payer: COMMERCIAL

## 2022-03-29 VITALS
BODY MASS INDEX: 26.31 KG/M2 | DIASTOLIC BLOOD PRESSURE: 70 MMHG | HEART RATE: 86 BPM | SYSTOLIC BLOOD PRESSURE: 120 MMHG | OXYGEN SATURATION: 98 % | WEIGHT: 134 LBS | HEIGHT: 60 IN | TEMPERATURE: 98 F | RESPIRATION RATE: 20 BRPM

## 2022-03-29 DIAGNOSIS — G35 MS (MULTIPLE SCLEROSIS) (HCC): ICD-10-CM

## 2022-03-29 DIAGNOSIS — Z00.00 ROUTINE GENERAL MEDICAL EXAMINATION AT A HEALTH CARE FACILITY: Primary | ICD-10-CM

## 2022-03-29 PROCEDURE — 99396 PREV VISIT EST AGE 40-64: CPT | Performed by: INTERNAL MEDICINE

## 2022-03-29 PROCEDURE — 3074F SYST BP LT 130 MM HG: CPT | Performed by: INTERNAL MEDICINE

## 2022-03-29 PROCEDURE — 3008F BODY MASS INDEX DOCD: CPT | Performed by: INTERNAL MEDICINE

## 2022-03-29 PROCEDURE — 3078F DIAST BP <80 MM HG: CPT | Performed by: INTERNAL MEDICINE

## 2022-03-30 ENCOUNTER — APPOINTMENT (OUTPATIENT)
Dept: PHYSICAL THERAPY | Facility: HOSPITAL | Age: 52
End: 2022-03-30
Attending: FAMILY MEDICINE
Payer: COMMERCIAL

## 2022-04-04 ENCOUNTER — APPOINTMENT (OUTPATIENT)
Dept: PHYSICAL THERAPY | Age: 52
End: 2022-04-04
Attending: FAMILY MEDICINE
Payer: COMMERCIAL

## 2022-04-06 ENCOUNTER — APPOINTMENT (OUTPATIENT)
Dept: PHYSICAL THERAPY | Facility: HOSPITAL | Age: 52
End: 2022-04-06
Attending: FAMILY MEDICINE
Payer: COMMERCIAL

## 2022-04-10 PROBLEM — K59.09 OTHER CONSTIPATION: Status: RESOLVED | Noted: 2018-09-28 | Resolved: 2022-04-10

## 2022-04-10 PROBLEM — B00.9 HSV INFECTION: Status: RESOLVED | Noted: 2018-09-28 | Resolved: 2022-04-10

## 2022-04-10 PROBLEM — M54.6 ACUTE RIGHT-SIDED THORACIC BACK PAIN: Status: RESOLVED | Noted: 2018-03-30 | Resolved: 2022-04-10

## 2022-04-11 ENCOUNTER — APPOINTMENT (OUTPATIENT)
Dept: PHYSICAL THERAPY | Age: 52
End: 2022-04-11
Attending: FAMILY MEDICINE
Payer: COMMERCIAL

## 2022-04-13 ENCOUNTER — APPOINTMENT (OUTPATIENT)
Dept: PHYSICAL THERAPY | Facility: HOSPITAL | Age: 52
End: 2022-04-13
Attending: FAMILY MEDICINE
Payer: COMMERCIAL

## 2022-04-18 ENCOUNTER — APPOINTMENT (OUTPATIENT)
Dept: PHYSICAL THERAPY | Age: 52
End: 2022-04-18
Attending: FAMILY MEDICINE
Payer: COMMERCIAL

## 2022-04-20 ENCOUNTER — APPOINTMENT (OUTPATIENT)
Dept: PHYSICAL THERAPY | Facility: HOSPITAL | Age: 52
End: 2022-04-20
Attending: FAMILY MEDICINE
Payer: COMMERCIAL

## 2022-04-25 ENCOUNTER — APPOINTMENT (OUTPATIENT)
Dept: PHYSICAL THERAPY | Age: 52
End: 2022-04-25
Attending: FAMILY MEDICINE
Payer: COMMERCIAL

## 2022-04-26 ENCOUNTER — APPOINTMENT (OUTPATIENT)
Dept: PHYSICAL THERAPY | Facility: HOSPITAL | Age: 52
End: 2022-04-26
Attending: FAMILY MEDICINE
Payer: COMMERCIAL

## 2022-04-27 ENCOUNTER — APPOINTMENT (OUTPATIENT)
Dept: PHYSICAL THERAPY | Facility: HOSPITAL | Age: 52
End: 2022-04-27
Attending: FAMILY MEDICINE
Payer: COMMERCIAL

## 2022-04-29 ENCOUNTER — APPOINTMENT (OUTPATIENT)
Dept: PHYSICAL THERAPY | Facility: HOSPITAL | Age: 52
End: 2022-04-29
Attending: FAMILY MEDICINE
Payer: COMMERCIAL

## 2022-05-02 ENCOUNTER — APPOINTMENT (OUTPATIENT)
Dept: PHYSICAL THERAPY | Age: 52
End: 2022-05-02
Attending: FAMILY MEDICINE
Payer: COMMERCIAL

## 2022-05-04 ENCOUNTER — APPOINTMENT (OUTPATIENT)
Dept: PHYSICAL THERAPY | Facility: HOSPITAL | Age: 52
End: 2022-05-04
Attending: FAMILY MEDICINE
Payer: COMMERCIAL

## 2022-05-06 ENCOUNTER — APPOINTMENT (OUTPATIENT)
Dept: PHYSICAL THERAPY | Facility: HOSPITAL | Age: 52
End: 2022-05-06
Attending: FAMILY MEDICINE
Payer: COMMERCIAL

## 2022-05-09 ENCOUNTER — APPOINTMENT (OUTPATIENT)
Dept: PHYSICAL THERAPY | Age: 52
End: 2022-05-09
Attending: FAMILY MEDICINE
Payer: COMMERCIAL

## 2022-05-11 ENCOUNTER — APPOINTMENT (OUTPATIENT)
Dept: PHYSICAL THERAPY | Facility: HOSPITAL | Age: 52
End: 2022-05-11
Attending: FAMILY MEDICINE
Payer: COMMERCIAL

## 2022-05-13 ENCOUNTER — APPOINTMENT (OUTPATIENT)
Dept: PHYSICAL THERAPY | Facility: HOSPITAL | Age: 52
End: 2022-05-13
Attending: FAMILY MEDICINE
Payer: COMMERCIAL

## 2022-05-16 ENCOUNTER — APPOINTMENT (OUTPATIENT)
Dept: PHYSICAL THERAPY | Age: 52
End: 2022-05-16
Attending: FAMILY MEDICINE
Payer: COMMERCIAL

## 2022-05-24 ENCOUNTER — APPOINTMENT (OUTPATIENT)
Dept: PHYSICAL THERAPY | Facility: HOSPITAL | Age: 52
End: 2022-05-24
Attending: FAMILY MEDICINE
Payer: COMMERCIAL

## 2022-05-27 ENCOUNTER — APPOINTMENT (OUTPATIENT)
Dept: PHYSICAL THERAPY | Facility: HOSPITAL | Age: 52
End: 2022-05-27
Attending: FAMILY MEDICINE
Payer: COMMERCIAL

## 2022-05-31 ENCOUNTER — APPOINTMENT (OUTPATIENT)
Dept: PHYSICAL THERAPY | Facility: HOSPITAL | Age: 52
End: 2022-05-31
Attending: FAMILY MEDICINE
Payer: COMMERCIAL

## 2022-07-21 ENCOUNTER — OFFICE VISIT (OUTPATIENT)
Dept: FAMILY MEDICINE CLINIC | Facility: CLINIC | Age: 52
End: 2022-07-21
Payer: COMMERCIAL

## 2022-07-21 VITALS
HEART RATE: 76 BPM | SYSTOLIC BLOOD PRESSURE: 122 MMHG | HEIGHT: 60 IN | WEIGHT: 136 LBS | BODY MASS INDEX: 26.7 KG/M2 | RESPIRATION RATE: 16 BRPM | DIASTOLIC BLOOD PRESSURE: 78 MMHG | OXYGEN SATURATION: 97 %

## 2022-07-21 DIAGNOSIS — G35 MS (MULTIPLE SCLEROSIS) (HCC): ICD-10-CM

## 2022-07-21 DIAGNOSIS — G89.29 CHRONIC ABDOMINAL PAIN: ICD-10-CM

## 2022-07-21 DIAGNOSIS — R10.9 CHRONIC ABDOMINAL PAIN: ICD-10-CM

## 2022-07-21 DIAGNOSIS — K59.09 CHRONIC CONSTIPATION: Primary | ICD-10-CM

## 2022-07-21 PROCEDURE — 3078F DIAST BP <80 MM HG: CPT | Performed by: FAMILY MEDICINE

## 2022-07-21 PROCEDURE — 3008F BODY MASS INDEX DOCD: CPT | Performed by: FAMILY MEDICINE

## 2022-07-21 PROCEDURE — 3074F SYST BP LT 130 MM HG: CPT | Performed by: FAMILY MEDICINE

## 2022-07-21 PROCEDURE — 99214 OFFICE O/P EST MOD 30 MIN: CPT | Performed by: FAMILY MEDICINE

## 2022-07-21 RX ORDER — LACTULOSE 10 G/15ML
20 SOLUTION ORAL 3 TIMES DAILY PRN
Qty: 473 ML | Refills: 0 | Status: SHIPPED | OUTPATIENT
Start: 2022-07-21

## 2022-08-10 ENCOUNTER — HOSPITAL ENCOUNTER (OUTPATIENT)
Dept: GENERAL RADIOLOGY | Age: 52
Discharge: HOME | End: 2022-08-10
Attending: FAMILY MEDICINE
Payer: COMMERCIAL

## 2022-08-10 DIAGNOSIS — K59.09 CHRONIC CONSTIPATION: ICD-10-CM

## 2022-08-10 PROCEDURE — 74018 RADEX ABDOMEN 1 VIEW: CPT | Performed by: FAMILY MEDICINE

## 2022-09-01 DIAGNOSIS — B00.9 HSV INFECTION: ICD-10-CM

## 2022-09-01 RX ORDER — VALACYCLOVIR HYDROCHLORIDE 500 MG/1
500 TABLET, FILM COATED ORAL 2 TIMES DAILY
Qty: 6 TABLET | Refills: 0 | Status: SHIPPED | OUTPATIENT
Start: 2022-09-01

## 2022-09-01 NOTE — TELEPHONE ENCOUNTER
Patient needs her Herpes medication refilled. She couldn't remember the name of it. Can we please let her know either way what LE has decided.     [facility], [address] [place] [address] AT [facility] [address] [facility] , [phone], [phone]

## 2022-09-13 ENCOUNTER — APPOINTMENT (OUTPATIENT)
Dept: GENERAL RADIOLOGY | Facility: HOSPITAL | Age: 52
End: 2022-09-13
Attending: EMERGENCY MEDICINE
Payer: COMMERCIAL

## 2022-09-13 ENCOUNTER — HOSPITAL ENCOUNTER (EMERGENCY)
Facility: HOSPITAL | Age: 52
Discharge: HOME | End: 2022-09-13
Attending: EMERGENCY MEDICINE
Payer: COMMERCIAL

## 2022-09-13 VITALS
HEART RATE: 76 BPM | WEIGHT: 125 LBS | TEMPERATURE: 98 F | OXYGEN SATURATION: 99 % | RESPIRATION RATE: 22 BRPM | BODY MASS INDEX: 24.54 KG/M2 | SYSTOLIC BLOOD PRESSURE: 145 MMHG | DIASTOLIC BLOOD PRESSURE: 86 MMHG | HEIGHT: 60 IN

## 2022-09-13 DIAGNOSIS — K59.00 CONSTIPATION, UNSPECIFIED CONSTIPATION TYPE: Primary | ICD-10-CM

## 2022-09-13 PROCEDURE — 99283 EMERGENCY DEPT VISIT LOW MDM: CPT | Performed by: EMERGENCY MEDICINE

## 2022-09-13 PROCEDURE — 74019 RADEX ABDOMEN 2 VIEWS: CPT | Performed by: EMERGENCY MEDICINE

## 2022-09-13 NOTE — ED INITIAL ASSESSMENT (HPI)
Patient states she has MS and has constipation problems. Gave herself a fleet enema at home yesterday and was able to pass a small amount.

## 2022-09-14 ENCOUNTER — TELEPHONE (OUTPATIENT)
Dept: FAMILY MEDICINE CLINIC | Facility: CLINIC | Age: 52
End: 2022-09-14

## 2022-09-16 ENCOUNTER — OFFICE VISIT (OUTPATIENT)
Dept: FAMILY MEDICINE CLINIC | Facility: CLINIC | Age: 52
End: 2022-09-16
Payer: COMMERCIAL

## 2022-09-16 VITALS
RESPIRATION RATE: 16 BRPM | OXYGEN SATURATION: 98 % | BODY MASS INDEX: 26.5 KG/M2 | WEIGHT: 135 LBS | HEIGHT: 60 IN | HEART RATE: 70 BPM | SYSTOLIC BLOOD PRESSURE: 118 MMHG | DIASTOLIC BLOOD PRESSURE: 68 MMHG

## 2022-09-16 DIAGNOSIS — K59.04 CHRONIC IDIOPATHIC CONSTIPATION: Primary | ICD-10-CM

## 2022-09-16 PROCEDURE — 3078F DIAST BP <80 MM HG: CPT | Performed by: PHYSICIAN ASSISTANT

## 2022-09-16 PROCEDURE — 3008F BODY MASS INDEX DOCD: CPT | Performed by: PHYSICIAN ASSISTANT

## 2022-09-16 PROCEDURE — 99214 OFFICE O/P EST MOD 30 MIN: CPT | Performed by: PHYSICIAN ASSISTANT

## 2022-09-16 PROCEDURE — 3074F SYST BP LT 130 MM HG: CPT | Performed by: PHYSICIAN ASSISTANT

## 2022-09-16 RX ORDER — LUBIPROSTONE 24 UG/1
24 CAPSULE, GELATIN COATED ORAL 2 TIMES DAILY WITH MEALS
Qty: 60 CAPSULE | Refills: 0 | Status: SHIPPED | OUTPATIENT
Start: 2022-09-16

## 2023-01-26 ENCOUNTER — PATIENT MESSAGE (OUTPATIENT)
Dept: FAMILY MEDICINE CLINIC | Facility: CLINIC | Age: 53
End: 2023-01-26

## 2023-01-27 NOTE — TELEPHONE ENCOUNTER
From: Jonas Perla  To: Gary Hein DO  Sent: 1/26/2023 5:54 PM CST  Subject: Martha Schultz     I want to know if this vitamin is good for me to take?

## 2023-01-31 NOTE — TELEPHONE ENCOUNTER
Pt requesting refill of hemorrhoid cream to [facility] on [address] Nostril Rim Text: The closure involved the nostril rim, WHICH IS A FREE MARGIN AND THEREFORE INDICATION FOR COMPLEX REPAIR.

## 2023-02-15 ENCOUNTER — TELEPHONE (OUTPATIENT)
Dept: FAMILY MEDICINE CLINIC | Facility: CLINIC | Age: 53
End: 2023-02-15

## 2023-02-15 DIAGNOSIS — Z12.31 ENCOUNTER FOR SCREENING MAMMOGRAM FOR MALIGNANT NEOPLASM OF BREAST: Primary | ICD-10-CM

## 2023-02-15 NOTE — TELEPHONE ENCOUNTER
PT called [facility] to schedule mammo and emely from [facility] couldn't find order. PT has an upcoming appt on 3/14 with Dr Olson. Please call [place],  at [phone], thank you.

## 2023-03-14 ENCOUNTER — OFFICE VISIT (OUTPATIENT)
Dept: FAMILY MEDICINE CLINIC | Facility: CLINIC | Age: 53
End: 2023-03-14
Payer: COMMERCIAL

## 2023-03-14 VITALS
RESPIRATION RATE: 16 BRPM | DIASTOLIC BLOOD PRESSURE: 74 MMHG | WEIGHT: 133 LBS | HEIGHT: 60 IN | BODY MASS INDEX: 26.11 KG/M2 | SYSTOLIC BLOOD PRESSURE: 110 MMHG | OXYGEN SATURATION: 98 % | HEART RATE: 78 BPM

## 2023-03-14 DIAGNOSIS — G35 MS (MULTIPLE SCLEROSIS) (HCC): Primary | ICD-10-CM

## 2023-03-14 DIAGNOSIS — R82.90 ABNORMAL URINE ODOR: ICD-10-CM

## 2023-03-14 DIAGNOSIS — R10.31 RLQ ABDOMINAL PAIN: ICD-10-CM

## 2023-03-14 DIAGNOSIS — G89.29 CHRONIC PAIN OF RIGHT KNEE: ICD-10-CM

## 2023-03-14 DIAGNOSIS — M25.561 CHRONIC PAIN OF RIGHT KNEE: ICD-10-CM

## 2023-03-14 DIAGNOSIS — R45.86 MOOD CHANGE: ICD-10-CM

## 2023-03-14 DIAGNOSIS — Z00.00 GENERAL MEDICAL EXAM: ICD-10-CM

## 2023-03-14 LAB
BILIRUBIN: NEGATIVE
GLUCOSE (URINE DIPSTICK): NEGATIVE MG/DL
KETONES (URINE DIPSTICK): NEGATIVE MG/DL
LEUKOCYTES: NEGATIVE
MULTISTIX LOT#: NORMAL NUMERIC
NITRITE, URINE: NEGATIVE
OCCULT BLOOD: NEGATIVE
PH, URINE: 6.5 (ref 4.5–8)
PROTEIN (URINE DIPSTICK): NEGATIVE MG/DL
SPECIFIC GRAVITY: 1.02 (ref 1–1.03)
URINE-COLOR: YELLOW
UROBILINOGEN,SEMI-QN: 0.2 MG/DL (ref 0–1.9)

## 2023-03-14 PROCEDURE — 87086 URINE CULTURE/COLONY COUNT: CPT | Performed by: FAMILY MEDICINE

## 2023-03-14 PROCEDURE — 81003 URINALYSIS AUTO W/O SCOPE: CPT | Performed by: FAMILY MEDICINE

## 2023-03-14 PROCEDURE — 3008F BODY MASS INDEX DOCD: CPT | Performed by: FAMILY MEDICINE

## 2023-03-14 PROCEDURE — 3074F SYST BP LT 130 MM HG: CPT | Performed by: FAMILY MEDICINE

## 2023-03-14 PROCEDURE — 3078F DIAST BP <80 MM HG: CPT | Performed by: FAMILY MEDICINE

## 2023-03-14 PROCEDURE — 99214 OFFICE O/P EST MOD 30 MIN: CPT | Performed by: FAMILY MEDICINE

## 2023-03-18 ENCOUNTER — HOSPITAL ENCOUNTER (OUTPATIENT)
Dept: MAMMOGRAPHY | Age: 53
Discharge: HOME | End: 2023-03-18
Attending: FAMILY MEDICINE
Payer: COMMERCIAL

## 2023-03-18 DIAGNOSIS — Z12.31 ENCOUNTER FOR SCREENING MAMMOGRAM FOR MALIGNANT NEOPLASM OF BREAST: ICD-10-CM

## 2023-03-18 PROCEDURE — 77063 BREAST TOMOSYNTHESIS BI: CPT | Performed by: FAMILY MEDICINE

## 2023-03-18 PROCEDURE — 77067 SCR MAMMO BI INCL CAD: CPT | Performed by: FAMILY MEDICINE

## 2023-03-20 DIAGNOSIS — R92.2 DENSE BREAST TISSUE ON MAMMOGRAM: Primary | ICD-10-CM

## 2023-03-27 ENCOUNTER — HOSPITAL ENCOUNTER (OUTPATIENT)
Dept: GENERAL RADIOLOGY | Age: 53
Discharge: HOME | End: 2023-03-27
Attending: FAMILY MEDICINE
Payer: COMMERCIAL

## 2023-03-27 ENCOUNTER — APPOINTMENT (OUTPATIENT)
Dept: MAMMOGRAPHY | Facility: HOSPITAL | Age: 53
End: 2023-03-27
Attending: FAMILY MEDICINE
Payer: COMMERCIAL

## 2023-03-27 ENCOUNTER — HOSPITAL ENCOUNTER (OUTPATIENT)
Dept: MAMMOGRAPHY | Facility: HOSPITAL | Age: 53
Discharge: HOME | End: 2023-03-27
Attending: FAMILY MEDICINE
Payer: COMMERCIAL

## 2023-03-27 ENCOUNTER — HOSPITAL ENCOUNTER (OUTPATIENT)
Dept: MRI IMAGING | Age: 53
Discharge: HOME | End: 2023-03-27
Attending: FAMILY MEDICINE
Payer: COMMERCIAL

## 2023-03-27 DIAGNOSIS — G35 MS (MULTIPLE SCLEROSIS) (HCC): ICD-10-CM

## 2023-03-27 DIAGNOSIS — R92.2 INCONCLUSIVE MAMMOGRAM: ICD-10-CM

## 2023-03-27 DIAGNOSIS — M25.561 CHRONIC PAIN OF RIGHT KNEE: ICD-10-CM

## 2023-03-27 DIAGNOSIS — G89.29 CHRONIC PAIN OF RIGHT KNEE: ICD-10-CM

## 2023-03-27 DIAGNOSIS — Z01.89 ENCOUNTER FOR LOWER EXTREMITY COMPARISON IMAGING STUDY: ICD-10-CM

## 2023-03-27 PROCEDURE — 77061 BREAST TOMOSYNTHESIS UNI: CPT | Performed by: FAMILY MEDICINE

## 2023-03-27 PROCEDURE — 73562 X-RAY EXAM OF KNEE 3: CPT | Performed by: FAMILY MEDICINE

## 2023-03-27 PROCEDURE — 73564 X-RAY EXAM KNEE 4 OR MORE: CPT | Performed by: FAMILY MEDICINE

## 2023-03-27 PROCEDURE — 77065 DX MAMMO INCL CAD UNI: CPT | Performed by: FAMILY MEDICINE

## 2023-03-27 PROCEDURE — 70553 MRI BRAIN STEM W/O & W/DYE: CPT | Performed by: FAMILY MEDICINE

## 2023-03-27 PROCEDURE — A9575 INJ GADOTERATE MEGLUMI 0.1ML: HCPCS | Performed by: FAMILY MEDICINE

## 2023-03-27 RX ORDER — GADOTERATE MEGLUMINE 376.9 MG/ML
13 INJECTION INTRAVENOUS
Status: COMPLETED | OUTPATIENT
Start: 2023-03-27 | End: 2023-03-27

## 2023-03-27 RX ADMIN — GADOTERATE MEGLUMINE 13 ML: 376.9 INJECTION INTRAVENOUS at 07:15:00

## 2023-03-28 DIAGNOSIS — M17.0 ARTHRITIS OF BOTH KNEES: ICD-10-CM

## 2023-03-28 DIAGNOSIS — R92.8 MAMMOGRAM ABNORMAL: ICD-10-CM

## 2023-03-28 DIAGNOSIS — R92.1 CALCIFICATION OF LEFT BREAST ON MAMMOGRAPHY: Primary | ICD-10-CM

## 2023-04-29 ENCOUNTER — LAB ENCOUNTER (OUTPATIENT)
Dept: LAB | Facility: HOSPITAL | Age: 53
End: 2023-04-29
Attending: FAMILY MEDICINE
Payer: COMMERCIAL

## 2023-04-29 DIAGNOSIS — Z00.00 GENERAL MEDICAL EXAM: ICD-10-CM

## 2023-04-29 LAB
ALBUMIN SERPL-MCNC: 3.5 G/DL (ref 3.4–5)
ALBUMIN/GLOB SERPL: 0.9 {RATIO} (ref 1–2)
ALP LIVER SERPL-CCNC: 55 U/L
ALT SERPL-CCNC: 15 U/L
ANION GAP SERPL CALC-SCNC: 4 MMOL/L (ref 0–18)
AST SERPL-CCNC: 15 U/L (ref 15–37)
BASOPHILS # BLD AUTO: 0.04 X10(3) UL (ref 0–0.2)
BASOPHILS NFR BLD AUTO: 0.8 %
BILIRUB SERPL-MCNC: 0.4 MG/DL (ref 0.1–2)
BUN BLD-MCNC: 14 MG/DL (ref 7–18)
CALCIUM BLD-MCNC: 8.5 MG/DL (ref 8.5–10.1)
CHLORIDE SERPL-SCNC: 110 MMOL/L (ref 98–112)
CHOLEST SERPL-MCNC: 198 MG/DL (ref ?–200)
CO2 SERPL-SCNC: 28 MMOL/L (ref 21–32)
CREAT BLD-MCNC: 0.6 MG/DL
EOSINOPHIL # BLD AUTO: 0.05 X10(3) UL (ref 0–0.7)
EOSINOPHIL NFR BLD AUTO: 0.9 %
ERYTHROCYTE [DISTWIDTH] IN BLOOD BY AUTOMATED COUNT: 13.4 %
EST. AVERAGE GLUCOSE BLD GHB EST-MCNC: 108 MG/DL (ref 68–126)
FASTING PATIENT LIPID ANSWER: YES
FASTING STATUS PATIENT QL REPORTED: YES
GFR SERPLBLD BASED ON 1.73 SQ M-ARVRAT: 108 ML/MIN/1.73M2 (ref 60–?)
GLOBULIN PLAS-MCNC: 3.8 G/DL (ref 2.8–4.4)
GLUCOSE BLD-MCNC: 85 MG/DL (ref 70–99)
HBA1C MFR BLD: 5.4 % (ref ?–5.7)
HCT VFR BLD AUTO: 39.1 %
HDLC SERPL-MCNC: 83 MG/DL (ref 40–59)
HGB BLD-MCNC: 12.4 G/DL
IMM GRANULOCYTES # BLD AUTO: 0.02 X10(3) UL (ref 0–1)
IMM GRANULOCYTES NFR BLD: 0.4 %
LDLC SERPL CALC-MCNC: 108 MG/DL (ref ?–100)
LYMPHOCYTES # BLD AUTO: 2.38 X10(3) UL (ref 1–4)
LYMPHOCYTES NFR BLD AUTO: 44.7 %
MCH RBC QN AUTO: 27.8 PG (ref 26–34)
MCHC RBC AUTO-ENTMCNC: 31.7 G/DL (ref 31–37)
MCV RBC AUTO: 87.7 FL
MONOCYTES # BLD AUTO: 0.47 X10(3) UL (ref 0.1–1)
MONOCYTES NFR BLD AUTO: 8.8 %
NEUTROPHILS # BLD AUTO: 2.36 X10 (3) UL (ref 1.5–7.7)
NEUTROPHILS # BLD AUTO: 2.36 X10(3) UL (ref 1.5–7.7)
NEUTROPHILS NFR BLD AUTO: 44.4 %
NONHDLC SERPL-MCNC: 115 MG/DL (ref ?–130)
OSMOLALITY SERPL CALC.SUM OF ELEC: 294 MOSM/KG (ref 275–295)
PLATELET # BLD AUTO: 286 10(3)UL (ref 150–450)
POTASSIUM SERPL-SCNC: 4.3 MMOL/L (ref 3.5–5.1)
PROT SERPL-MCNC: 7.3 G/DL (ref 6.4–8.2)
RBC # BLD AUTO: 4.46 X10(6)UL
SODIUM SERPL-SCNC: 142 MMOL/L (ref 136–145)
T4 FREE SERPL-MCNC: 0.9 NG/DL (ref 0.8–1.7)
TRIGL SERPL-MCNC: 38 MG/DL (ref 30–149)
TSI SER-ACNC: 0.85 MIU/ML (ref 0.36–3.74)
VIT B12 SERPL-MCNC: 1058 PG/ML (ref 193–986)
VIT D+METAB SERPL-MCNC: 83.6 NG/ML (ref 30–100)
VLDLC SERPL CALC-MCNC: 6 MG/DL (ref 0–30)
WBC # BLD AUTO: 5.3 X10(3) UL (ref 4–11)

## 2023-04-29 PROCEDURE — 84439 ASSAY OF FREE THYROXINE: CPT

## 2023-04-29 PROCEDURE — 83036 HEMOGLOBIN GLYCOSYLATED A1C: CPT

## 2023-04-29 PROCEDURE — 36415 COLL VENOUS BLD VENIPUNCTURE: CPT

## 2023-04-29 PROCEDURE — 82306 VITAMIN D 25 HYDROXY: CPT

## 2023-04-29 PROCEDURE — 85025 COMPLETE CBC W/AUTO DIFF WBC: CPT

## 2023-04-29 PROCEDURE — 84443 ASSAY THYROID STIM HORMONE: CPT

## 2023-04-29 PROCEDURE — 80061 LIPID PANEL: CPT

## 2023-04-29 PROCEDURE — 80053 COMPREHEN METABOLIC PANEL: CPT

## 2023-04-29 PROCEDURE — 82607 VITAMIN B-12: CPT

## 2023-06-21 ENCOUNTER — OFFICE VISIT (OUTPATIENT)
Dept: NEUROLOGY | Facility: CLINIC | Age: 53
End: 2023-06-21
Payer: COMMERCIAL

## 2023-06-21 VITALS
BODY MASS INDEX: 26 KG/M2 | WEIGHT: 135.63 LBS | SYSTOLIC BLOOD PRESSURE: 126 MMHG | DIASTOLIC BLOOD PRESSURE: 60 MMHG | HEART RATE: 68 BPM | RESPIRATION RATE: 16 BRPM

## 2023-06-21 DIAGNOSIS — G35 MS (MULTIPLE SCLEROSIS) (HCC): Primary | ICD-10-CM

## 2023-06-21 PROCEDURE — 3074F SYST BP LT 130 MM HG: CPT | Performed by: OTHER

## 2023-06-21 PROCEDURE — 99213 OFFICE O/P EST LOW 20 MIN: CPT | Performed by: OTHER

## 2023-06-21 PROCEDURE — 3078F DIAST BP <80 MM HG: CPT | Performed by: OTHER

## 2023-06-24 ENCOUNTER — TELEMEDICINE (OUTPATIENT)
Dept: FAMILY MEDICINE CLINIC | Facility: CLINIC | Age: 53
End: 2023-06-24
Payer: COMMERCIAL

## 2023-06-24 DIAGNOSIS — Z02.9 ENCOUNTERS FOR ADMINISTRATIVE PURPOSE: Primary | ICD-10-CM

## 2023-07-17 ENCOUNTER — OFFICE VISIT (OUTPATIENT)
Dept: FAMILY MEDICINE CLINIC | Facility: CLINIC | Age: 53
End: 2023-07-17
Payer: COMMERCIAL

## 2023-07-17 VITALS
RESPIRATION RATE: 16 BRPM | HEART RATE: 66 BPM | BODY MASS INDEX: 25.91 KG/M2 | SYSTOLIC BLOOD PRESSURE: 124 MMHG | OXYGEN SATURATION: 98 % | WEIGHT: 132 LBS | DIASTOLIC BLOOD PRESSURE: 78 MMHG | HEIGHT: 60 IN

## 2023-07-17 DIAGNOSIS — B00.9 HSV (HERPES SIMPLEX VIRUS) INFECTION: Primary | ICD-10-CM

## 2023-07-17 DIAGNOSIS — Z91.09 ENVIRONMENTAL ALLERGIES: ICD-10-CM

## 2023-07-17 PROCEDURE — 3074F SYST BP LT 130 MM HG: CPT | Performed by: PHYSICIAN ASSISTANT

## 2023-07-17 PROCEDURE — 99214 OFFICE O/P EST MOD 30 MIN: CPT | Performed by: PHYSICIAN ASSISTANT

## 2023-07-17 PROCEDURE — 3008F BODY MASS INDEX DOCD: CPT | Performed by: PHYSICIAN ASSISTANT

## 2023-07-17 PROCEDURE — 3078F DIAST BP <80 MM HG: CPT | Performed by: PHYSICIAN ASSISTANT

## 2023-07-17 RX ORDER — OLOPATADINE HYDROCHLORIDE 2 MG/ML
1 SOLUTION/ DROPS OPHTHALMIC DAILY
Qty: 2.5 ML | Refills: 0 | Status: SHIPPED | OUTPATIENT
Start: 2023-07-17

## 2023-07-17 RX ORDER — VALACYCLOVIR HYDROCHLORIDE 1 G/1
1000 TABLET, FILM COATED ORAL EVERY 12 HOURS SCHEDULED
Qty: 14 TABLET | Refills: 0 | Status: SHIPPED | OUTPATIENT
Start: 2023-07-17 | End: 2023-07-24

## 2023-07-17 RX ORDER — LEVOCETIRIZINE DIHYDROCHLORIDE 5 MG/1
5 TABLET, FILM COATED ORAL EVERY EVENING
Qty: 30 TABLET | Refills: 0 | Status: SHIPPED | OUTPATIENT
Start: 2023-07-17

## 2023-07-17 RX ORDER — FLUCONAZOLE 150 MG/1
TABLET ORAL
Qty: 2 TABLET | Refills: 0 | Status: SHIPPED | OUTPATIENT
Start: 2023-07-17

## 2023-07-17 NOTE — PROGRESS NOTES
Subjective:   Patient ID: Nina Noguera is a 46year old female. HPI  Patient presents for evaluation of a bump to the right buttock near the gluteal cleft. Noticed this a few days ago while on vacation. It is very sore and has a burning tingling sensation. She shaved the area which nicked the bump and caused it to bleed. She applied Neosporin and a bandage. It is getting slightly better but not resolved. She is worried about an infection. Also has ongoing allergy symptoms including congestion, rhinorrhea, postnasal drip, and eye symptoms. Not currently using anything over-the-counter. History/Other:   Review of Systems   Constitutional:  Negative for chills, diaphoresis, fatigue and fever. HENT:  Positive for congestion, postnasal drip and rhinorrhea. Negative for ear pain, hearing loss, sinus pressure, sinus pain and sore throat. Respiratory:  Negative for cough, shortness of breath and wheezing. Cardiovascular:  Negative for chest pain and palpitations. Musculoskeletal:  Negative for arthralgias and myalgias. Skin:  Positive for rash. Neurological:  Negative for dizziness, weakness, light-headedness and headaches. Psychiatric/Behavioral:  Negative for sleep disturbance. Current Outpatient Medications   Medication Sig Dispense Refill    lubiprostone 24 MCG Oral Cap Take 1 capsule (24 mcg total) by mouth 2 (two) times daily with meals. 60 capsule 0    lactulose 10 GM/15ML Oral Solution Take 30 mL (20 g total) by mouth 3 (three) times daily as needed. 473 mL 0    Turmeric (QC TUMERIC COMPLEX OR) Take by mouth. Gluc-Chonn-MSM-Boswellia-Vit D (GLUCOSAMINE CHOND TRIPLE/VIT D) Oral Tab Take by mouth. SUPER B COMPLEX/C OR Take by mouth. Multiple Vitamins-Minerals (WOMENS ONE DAILY OR) Take by mouth daily.        Allergies:  Sulfamethoxazole W/*    SWELLING, SHORTNESS OF BREATH,                            HIVES    Objective:   Physical Exam  Vitals and nursing note reviewed. Constitutional:       Appearance: She is well-developed. HENT:      Head: Normocephalic and atraumatic. Right Ear: External ear normal. A middle ear effusion is present. Left Ear: External ear normal. A middle ear effusion is present. Nose: Mucosal edema and rhinorrhea present. Eyes:      Conjunctiva/sclera: Conjunctivae normal.      Pupils: Pupils are equal, round, and reactive to light. Cardiovascular:      Rate and Rhythm: Normal rate and regular rhythm. Heart sounds: Normal heart sounds. Pulmonary:      Effort: Pulmonary effort is normal.      Breath sounds: Normal breath sounds. No wheezing or rales. Musculoskeletal:      Cervical back: Normal range of motion and neck supple. Lymphadenopathy:      Cervical: No cervical adenopathy. Skin:     General: Skin is warm and dry. Findings: Rash present. Rash is vesicular (5-6 clustered vesicular/papular lesions of the right gluteal cleft c/w herpes). Neurological:      Mental Status: She is alert. Assessment & Plan:   1. HSV (herpes simplex virus) infection  Rash on the buttock is consistent with HSV infection. Will empirically treat with Valtrex as directed. Follow-up if not soon better or if symptoms worsen. - valACYclovir 1 G Oral Tab; Take 1 tablet (1,000 mg total) by mouth every 12 (twelve) hours for 7 days. Dispense: 14 tablet; Refill: 0    2. Environmental allergies  Start a trial of Xyzal, over-the-counter Flonase, and Pataday eyedrops or over-the-counter Opcon-A. Follow-up in 2 to 4 weeks if symptoms worsen or do not improve. - levocetirizine (XYZAL ALLERGY 24HR) 5 MG Oral Tab; Take 1 tablet (5 mg total) by mouth every evening. Dispense: 30 tablet; Refill: 0  - Olopatadine HCl 0.2 % Ophthalmic Solution; Apply 1 drop to eye daily.   Dispense: 2.5 mL; Refill: 0

## 2023-09-20 ENCOUNTER — TELEPHONE (OUTPATIENT)
Dept: FAMILY MEDICINE CLINIC | Facility: CLINIC | Age: 53
End: 2023-09-20

## 2023-09-20 RX ORDER — HYDROCORTISONE 25 MG/G
1 CREAM TOPICAL 2 TIMES DAILY
Qty: 1 EACH | Refills: 3 | Status: SHIPPED | OUTPATIENT
Start: 2023-09-20

## 2023-09-20 NOTE — TELEPHONE ENCOUNTER
Pt requesting a refill on her hemorrhoid medication. She does not recall the name.   Asking for nurse to call her back

## 2023-09-20 NOTE — TELEPHONE ENCOUNTER
Leticia Query,  Patient asking for a refill on Proctozone cream. Last filled 4/10/22? Medication pended.  Last office visit  7/17/23

## 2023-10-01 ENCOUNTER — OFFICE VISIT (OUTPATIENT)
Dept: FAMILY MEDICINE CLINIC | Facility: CLINIC | Age: 53
End: 2023-10-01
Payer: COMMERCIAL

## 2023-10-01 VITALS
TEMPERATURE: 98 F | OXYGEN SATURATION: 97 % | HEIGHT: 60 IN | SYSTOLIC BLOOD PRESSURE: 118 MMHG | BODY MASS INDEX: 26 KG/M2 | HEART RATE: 74 BPM | DIASTOLIC BLOOD PRESSURE: 84 MMHG | RESPIRATION RATE: 16 BRPM

## 2023-10-01 DIAGNOSIS — J04.0 LARYNGITIS: Primary | ICD-10-CM

## 2023-10-01 DIAGNOSIS — H69.93 DYSFUNCTION OF BOTH EUSTACHIAN TUBES: ICD-10-CM

## 2023-10-01 PROCEDURE — 99213 OFFICE O/P EST LOW 20 MIN: CPT | Performed by: NURSE PRACTITIONER

## 2023-10-01 PROCEDURE — 3074F SYST BP LT 130 MM HG: CPT | Performed by: NURSE PRACTITIONER

## 2023-10-01 PROCEDURE — 3079F DIAST BP 80-89 MM HG: CPT | Performed by: NURSE PRACTITIONER

## 2023-10-01 RX ORDER — FLUTICASONE PROPIONATE 50 MCG
1 SPRAY, SUSPENSION (ML) NASAL 2 TIMES DAILY
Qty: 1 EACH | Refills: 2 | Status: SHIPPED | OUTPATIENT
Start: 2023-10-01 | End: 2023-10-31

## 2023-10-01 NOTE — PATIENT INSTRUCTIONS
Please start Flonase 1 spray each nostril twice daily before brushing teeth. Use for at least one week. This will help decrease nasal drip causing laryngitis and \"tickle\" in throat, as well as help with ear pressure/nasal passage swelling. Rinse mouth after use. See laryngitis instructions. Follow up with Dr. Monet Domingo if symptoms persist longer than two weeks.

## 2023-10-12 ENCOUNTER — HOSPITAL ENCOUNTER (OUTPATIENT)
Dept: MAMMOGRAPHY | Facility: HOSPITAL | Age: 53
Discharge: HOME | End: 2023-10-12
Attending: FAMILY MEDICINE
Payer: COMMERCIAL

## 2023-10-12 DIAGNOSIS — R92.1 CALCIFICATION OF LEFT BREAST ON MAMMOGRAPHY: ICD-10-CM

## 2023-10-12 DIAGNOSIS — R92.8 MAMMOGRAM ABNORMAL: ICD-10-CM

## 2023-10-12 PROCEDURE — 77065 DX MAMMO INCL CAD UNI: CPT | Performed by: FAMILY MEDICINE

## 2023-10-12 PROCEDURE — 77061 BREAST TOMOSYNTHESIS UNI: CPT | Performed by: FAMILY MEDICINE

## 2023-10-13 ENCOUNTER — OFFICE VISIT (OUTPATIENT)
Dept: FAMILY MEDICINE CLINIC | Facility: CLINIC | Age: 53
End: 2023-10-13
Payer: COMMERCIAL

## 2023-10-13 VITALS
OXYGEN SATURATION: 96 % | RESPIRATION RATE: 16 BRPM | SYSTOLIC BLOOD PRESSURE: 114 MMHG | HEART RATE: 78 BPM | DIASTOLIC BLOOD PRESSURE: 72 MMHG | HEIGHT: 61.25 IN | WEIGHT: 132 LBS | BODY MASS INDEX: 24.6 KG/M2

## 2023-10-13 DIAGNOSIS — B00.9 HSV (HERPES SIMPLEX VIRUS) INFECTION: ICD-10-CM

## 2023-10-13 PROCEDURE — 3008F BODY MASS INDEX DOCD: CPT | Performed by: FAMILY MEDICINE

## 2023-10-13 PROCEDURE — 3078F DIAST BP <80 MM HG: CPT | Performed by: FAMILY MEDICINE

## 2023-10-13 PROCEDURE — 99214 OFFICE O/P EST MOD 30 MIN: CPT | Performed by: FAMILY MEDICINE

## 2023-10-13 PROCEDURE — 3074F SYST BP LT 130 MM HG: CPT | Performed by: FAMILY MEDICINE

## 2023-10-13 RX ORDER — VALACYCLOVIR HYDROCHLORIDE 1 G/1
1000 TABLET, FILM COATED ORAL EVERY 12 HOURS SCHEDULED
Qty: 14 TABLET | Refills: 5 | Status: SHIPPED | OUTPATIENT
Start: 2023-10-13 | End: 2023-10-20

## 2023-10-13 RX ORDER — FLUCONAZOLE 150 MG/1
150 TABLET ORAL ONCE
Qty: 2 TABLET | Refills: 0 | Status: SHIPPED | OUTPATIENT
Start: 2023-10-13 | End: 2023-10-13

## 2023-10-17 ENCOUNTER — TELEPHONE (OUTPATIENT)
Dept: FAMILY MEDICINE CLINIC | Facility: CLINIC | Age: 53
End: 2023-10-17

## 2023-10-17 RX ORDER — DOXYCYCLINE HYCLATE 100 MG
100 TABLET ORAL 2 TIMES DAILY
Qty: 14 TABLET | Refills: 0 | Status: SHIPPED | OUTPATIENT
Start: 2023-10-17 | End: 2023-10-24

## 2023-10-17 NOTE — TELEPHONE ENCOUNTER
HSV, prescribed valtrex  x 7 days, pt started on Saturday, not yet finished, no change in symptoms. Still with lesion. She states LE was going to give her antibiotic?  Please advise

## 2023-12-29 NOTE — PROGRESS NOTES
HPI:   Candida Thomason is a 53 year old female who presents for a complete physical exam.       Wt Readings from Last 6 Encounters:   01/02/24 133 lb (60.3 kg)   10/13/23 132 lb (59.9 kg)   07/17/23 132 lb (59.9 kg)   06/21/23 135 lb 9.6 oz (61.5 kg)   03/14/23 133 lb (60.3 kg)   09/16/22 135 lb (61.2 kg)     Body mass index is 24.93 kg/m².     Cholesterol, Total (mg/dL)   Date Value   04/29/2023 198   07/17/2020 188   12/27/2019 208 (H)     HDL Cholesterol (mg/dL)   Date Value   04/29/2023 83 (H)   07/17/2020 66 (H)   12/27/2019 65 (H)     LDL Cholesterol (mg/dL)   Date Value   04/29/2023 108 (H)   07/17/2020 110 (H)   12/27/2019 130 (H)     AST (U/L)   Date Value   04/29/2023 15   07/17/2020 12 (L)   02/03/2020 13 (L)     ALT (U/L)   Date Value   04/29/2023 15   07/17/2020 16   02/03/2020 15         last pap- years   Partial hysterectomy   all previous paps normal  No vaginal discharge  No bladder dysfunction  No performing self breast exams  last mammogram- utd  c-scope - 2017  dexa - due   No calcium and vit D supplementation  No family history of colon or ovarian cancer  Mom with breast cancer     Covid + one week ago   Feeling ok   No sob     Current Outpatient Medications   Medication Sig Dispense Refill    Turmeric (QC TUMERIC COMPLEX OR) Take by mouth.      Gluc-Chonn-MSM-Boswellia-Vit D (GLUCOSAMINE CHOND TRIPLE/VIT D) Oral Tab Take by mouth.      SUPER B COMPLEX/C OR Take by mouth.      Multiple Vitamins-Minerals (WOMENS ONE DAILY OR) Take by mouth daily.        Past Medical History:   Diagnosis Date    Arthritis     Back pain     Bloating     Constipation     Frequent use of laxatives     HSV (herpes simplex virus) anogenital infection     HSV infection 9/28/2018    MS (multiple sclerosis) ([facility]) 1999    MS (multiple sclerosis) ([facility]) 4/28/2017      Past Surgical History:   Procedure Laterality Date    EGD N/A 1/5/2017    Procedure: ESOPHAGOGASTRODUODENOSCOPY, COLONOSCOPY, POSSIBLE BIOPSY, POSSIBLE  POLYPECTOMY 22726, 55455;  Surgeon: Jomar Gonzalez MD;  Location: [facility], [facility]    HYSTERECTOMY  11/2012    DaVinci/No BSO      Family History   Problem Relation Age of Onset    Diabetes Mother     Hypertension Mother     Breast Cancer Mother 61      Social History:   Social History     Socioeconomic History    Marital status:    Tobacco Use    Smoking status: Never    Smokeless tobacco: Never   Vaping Use    Vaping Use: Never used   Substance and Sexual Activity    Alcohol use: No     Alcohol/week: 0.0 standard drinks of alcohol    Drug use: No    Sexual activity: Not Currently   Other Topics Concern    Blood Transfusions No    Caffeine Concern No    Exercise No   Social History Narrative    HISTORY OF SEXUAL ABUSE AT AGE 10     Occ: . : . Children: .   Exercise: yes Diet: watches minimally     REVIEW OF SYSTEMS:   GENERAL: feels well otherwise  SKIN: denies any unusual skin lesions  EYES:denies blurred vision or double vision  HEENT: denies nasal congestion, sinus pain or ST  LUNGS: denies shortness of breath with exertion  CARDIOVASCULAR: denies chest pain on exertion  GI: denies abdominal pain,denies heartburn  : denies dysuria, vaginal discharge or itching  MUSCULOSKELETAL: denies back pain  NEURO: denies headaches  PSYCHE: denies depression or anxiety  HEMATOLOGIC: denies hx of anemia  ENDOCRINE: denies thyroid history  ALL/ASTHMA: denies hx of allergy or asthma    SEES NEURO FOR MS     EXAM:   /82   Pulse 62   Temp 98.6 °F (37 °C) (Oral)   Resp 16   Ht 5' 1.25\" (1.556 m)   Wt 133 lb (60.3 kg)   SpO2 98%   BMI 24.93 kg/m²   Body mass index is 24.93 kg/m².   GENERAL: alert and oriented X 3, well developed, well nourished,in no apparent distress  CARDIO: RRR without murmur  LUNGS: clear to auscultation  NECK: supple,no adenopathy,no thyromegaly  HEENT: atraumatic, normocephalic,ears and throat are clear  EYES:PERRLA, EOMI, normal,conjunctiva are clear  SKIN:  norashes,no suspicious lesions  GI: good BS's,no masses, HSM or tenderness  CHEST: no chest tenderness  MUSCULOSKELETAL: back is not tender,FROM of the back  EXTREMITIES: no cyanosis, clubbing or edema  NEURO: cranial nerves are intact,motor and sensory are grossly intact    ASSESSMENT AND PLAN:   Candida Thomason is a 53 year old female who presents with     1. HSV (herpes simplex virus) infection    - valACYclovir 1 G Oral Tab; Take 1 tablet (1,000 mg total) by mouth every 12 (twelve) hours for 7 days.  Dispense: 14 tablet; Refill: 2    2. Annual physical exam    - Free T4, (Free Thyroxine); Future  - Assay, Thyroid Stim Hormone; Future  - Lipid Panel; Future  - CBC With Differential With Platelet; Future  - Vitamin B12; Future  - Comp Metabolic Panel (14); Future  - Hemoglobin A1C; Future    3. Dense breast    - US BREAST BILATERAL COMPLETE (CPT=76641-50); Future    4. Encounter for breast cancer screening using non-mammogram modality    - XR DEXA BONE DENSITOMETRY (CPT=77080); Future    Discussed diet, exercise,calcium, vitamin D, fish oil and self breast exams.   Questions answered and patient indicates understanding of these issues and agrees to the plan.  Follow up in 1 year or sooner if needed .

## 2024-01-02 ENCOUNTER — OFFICE VISIT (OUTPATIENT)
Dept: FAMILY MEDICINE CLINIC | Facility: CLINIC | Age: 54
End: 2024-01-02
Payer: COMMERCIAL

## 2024-01-02 VITALS
HEIGHT: 61.25 IN | TEMPERATURE: 99 F | RESPIRATION RATE: 16 BRPM | DIASTOLIC BLOOD PRESSURE: 82 MMHG | SYSTOLIC BLOOD PRESSURE: 120 MMHG | HEART RATE: 62 BPM | BODY MASS INDEX: 24.79 KG/M2 | OXYGEN SATURATION: 98 % | WEIGHT: 133 LBS

## 2024-01-02 DIAGNOSIS — R92.30 DENSE BREAST: ICD-10-CM

## 2024-01-02 DIAGNOSIS — Z12.39 ENCOUNTER FOR BREAST CANCER SCREENING USING NON-MAMMOGRAM MODALITY: ICD-10-CM

## 2024-01-02 DIAGNOSIS — B00.9 HSV (HERPES SIMPLEX VIRUS) INFECTION: ICD-10-CM

## 2024-01-02 DIAGNOSIS — Z00.00 ANNUAL PHYSICAL EXAM: Primary | ICD-10-CM

## 2024-01-02 PROCEDURE — 99396 PREV VISIT EST AGE 40-64: CPT | Performed by: FAMILY MEDICINE

## 2024-01-02 PROCEDURE — 3079F DIAST BP 80-89 MM HG: CPT | Performed by: FAMILY MEDICINE

## 2024-01-02 PROCEDURE — 3008F BODY MASS INDEX DOCD: CPT | Performed by: FAMILY MEDICINE

## 2024-01-02 PROCEDURE — 3074F SYST BP LT 130 MM HG: CPT | Performed by: FAMILY MEDICINE

## 2024-01-02 RX ORDER — VALACYCLOVIR HYDROCHLORIDE 1 G/1
1000 TABLET, FILM COATED ORAL EVERY 12 HOURS SCHEDULED
Qty: 14 TABLET | Refills: 2 | Status: SHIPPED | OUTPATIENT
Start: 2024-01-02 | End: 2024-01-09

## 2024-01-05 ENCOUNTER — HOSPITAL ENCOUNTER (OUTPATIENT)
Dept: BONE DENSITY | Age: 54
Discharge: HOME | End: 2024-01-05
Attending: FAMILY MEDICINE
Payer: COMMERCIAL

## 2024-01-05 DIAGNOSIS — Z12.39 ENCOUNTER FOR BREAST CANCER SCREENING USING NON-MAMMOGRAM MODALITY: ICD-10-CM

## 2024-01-05 PROCEDURE — 77080 DXA BONE DENSITY AXIAL: CPT | Performed by: FAMILY MEDICINE

## 2024-01-08 ENCOUNTER — TELEPHONE (OUTPATIENT)
Dept: FAMILY MEDICINE CLINIC | Facility: CLINIC | Age: 54
End: 2024-01-08

## 2024-02-19 ENCOUNTER — APPOINTMENT (OUTPATIENT)
Dept: URGENT CARE | Age: 54
End: 2024-02-19

## 2024-02-20 ENCOUNTER — HOSPITAL ENCOUNTER (OUTPATIENT)
Age: 54
Discharge: HOME | End: 2024-02-20
Payer: COMMERCIAL

## 2024-02-20 VITALS
HEART RATE: 80 BPM | BODY MASS INDEX: 24 KG/M2 | OXYGEN SATURATION: 97 % | DIASTOLIC BLOOD PRESSURE: 96 MMHG | SYSTOLIC BLOOD PRESSURE: 125 MMHG | WEIGHT: 130 LBS | TEMPERATURE: 98 F | RESPIRATION RATE: 18 BRPM

## 2024-02-20 DIAGNOSIS — S01.319A: Primary | ICD-10-CM

## 2024-02-20 DIAGNOSIS — B00.9 HSV (HERPES SIMPLEX VIRUS) INFECTION: ICD-10-CM

## 2024-02-20 DIAGNOSIS — B00.1 RECURRENT COLD SORES: ICD-10-CM

## 2024-02-20 PROCEDURE — 99203 OFFICE O/P NEW LOW 30 MIN: CPT | Performed by: NURSE PRACTITIONER

## 2024-02-20 RX ORDER — VALACYCLOVIR HYDROCHLORIDE 1 G/1
1000 TABLET, FILM COATED ORAL EVERY 12 HOURS SCHEDULED
Qty: 14 TABLET | Refills: 0 | Status: SHIPPED | OUTPATIENT
Start: 2024-02-20 | End: 2024-02-27

## 2024-02-20 NOTE — ED PROVIDER NOTES
Patient Seen in: [facility]      History     Chief Complaint   Patient presents with    Ear Problem Pain     Stated Complaint: Left ear injury    Subjective:   This is a 53-year-old female with below stated medical history.  Presents to immediate care for left earlobe injury.  She also reports a return in her oral cold sores.  States she is in need of a refill of her valacyclovir.  Denies any trauma or injury to the ear.  States she woke up Friday morning and observed her earlobe has a tear from the hole from her earring to the edge of the earlobe.  She denies any redness or drainage from the area.    The history is provided by the patient.           Objective:   Past Medical History:   Diagnosis Date    Arthritis     Back pain     Bloating     Constipation     Frequent use of laxatives     HSV (herpes simplex virus) anogenital infection     HSV infection 9/28/2018    MS (multiple sclerosis) ([facility]) 1999    MS (multiple sclerosis) ([facility]) 4/28/2017              Past Surgical History:   Procedure Laterality Date    EGD N/A 1/5/2017    Procedure: ESOPHAGOGASTRODUODENOSCOPY, COLONOSCOPY, POSSIBLE BIOPSY, POSSIBLE POLYPECTOMY 67321, 35636;  Surgeon: Jomar Gonzalez MD;  [facility]: [facility]    HYSTERECTOMY  11/2012    DaVinci/No BSO                Social History     Socioeconomic History    Marital status:    Tobacco Use    Smoking status: Never    Smokeless tobacco: Never   Vaping Use    Vaping Use: Never used   Substance and Sexual Activity    Alcohol use: No     Alcohol/week: 0.0 standard drinks of alcohol    Drug use: No    Sexual activity: Not Currently   Other Topics Concern    Blood Transfusions No    Caffeine Concern No    Exercise No   Social History Narrative    HISTORY OF SEXUAL ABUSE AT AGE 10              Review of Systems   Constitutional:  Negative for chills and fever.   HENT:  Negative for congestion and sore throat.    Respiratory:  Negative for cough,  shortness of breath and wheezing.    Cardiovascular:  Negative for chest pain.   Gastrointestinal:  Negative for abdominal pain, diarrhea, nausea and vomiting.   Genitourinary:  Negative for dysuria.   Musculoskeletal:  Negative for back pain, neck pain and neck stiffness.   Skin:  Positive for rash and wound.   Allergic/Immunologic: Negative for environmental allergies.   Neurological:  Negative for headaches.   Hematological:  Does not bruise/bleed easily.       Positive for stated complaint: Left ear injury  Other systems are as noted in HPI.  Constitutional and vital signs reviewed.      All other systems reviewed and negative except as noted above.    Physical Exam     ED Triage Vitals [02/20/24 1617]   BP (!) 125/96   Pulse 80   Resp 18   Temp 98 °F (36.7 °C)   Temp src Oral   SpO2 97 %   O2 Device None (Room air)       Current:BP (!) 125/96   Pulse 80   Temp 98 °F (36.7 °C) (Oral)   Resp 18   Wt 59 kg   SpO2 97%   BMI 24.36 kg/m²         Physical Exam  Vitals and nursing note reviewed.   Constitutional:       General: She is not in acute distress.     Appearance: Normal appearance. She is not ill-appearing, toxic-appearing or diaphoretic.   HENT:      Head: Normocephalic and atraumatic.        Comments: Healed tear in left earlobe     Right Ear: External ear normal.      Left Ear: External ear normal.      Nose: Nose normal.      Mouth/Throat:      Mouth: Mucous membranes are moist.      Pharynx: Oropharynx is clear.   Eyes:      General:         Right eye: No discharge.         Left eye: No discharge.      Extraocular Movements: Extraocular movements intact.      Conjunctiva/sclera: Conjunctivae normal.   Cardiovascular:      Rate and Rhythm: Normal rate.   Pulmonary:      Effort: Pulmonary effort is normal.   Musculoskeletal:      Cervical back: Neck supple.      Right lower leg: No edema.      Left lower leg: No edema.   Skin:     General: Skin is warm and dry.      Capillary Refill: Capillary refill  takes less than 2 seconds.      Findings: No rash.   Neurological:      Mental Status: She is alert and oriented to person, place, and time.   Psychiatric:         Mood and Affect: Mood normal.         Behavior: Behavior normal.               ED Course   Labs Reviewed - No data to display          DC home.          MDM      Vital signs stable.  Patient is well-appearing and nontoxic looking.  Presents to immediate care for tear in her left earlobe as well as refill of valacyclovir for recurrent HSV.    Left earlobe tear appears to be healed with no evidence of infection.  We discussed we are unable to repair the earlobe in this clinic setting.  She should follow-up with plastic surgery to have her earlobe repaired.    DC home.  Refill given for valacyclovir.  She verbalized understanding, and agreed to plan of care.  All questions answered.                                   Medical Decision Making      Disposition and Plan     Clinical Impression:  1. Tear of earlobe, initial encounter    2. Recurrent cold sores    3. HSV (herpes simplex virus) infection         Disposition:  Discharge  2/20/2024  4:26 pm    Follow-up:  Bernabe Galvan MD  [facility] [address] [place] [address] [place] [id number]  [phone]    In 1 week            Medications Prescribed:  Discharge Medication List as of 2/20/2024  4:33 PM

## 2024-02-20 NOTE — DISCHARGE INSTRUCTIONS
Please follow-up with plastic surgery if you would like your earlobe repaired.  Please take Valtrex as prescribed.

## 2024-02-21 ENCOUNTER — TELEPHONE (OUTPATIENT)
Dept: FAMILY MEDICINE CLINIC | Facility: CLINIC | Age: 54
End: 2024-02-21

## 2024-02-21 DIAGNOSIS — S01.312A TEAR OF LEFT EARLOBE, INITIAL ENCOUNTER: Primary | ICD-10-CM

## 2024-02-21 NOTE — TELEPHONE ENCOUNTER
FYI:  patient is requesting referral to plastics for ear lobe repair. She has been seen in IC as of yesterday. Referral placed.

## 2024-02-21 NOTE — TELEPHONE ENCOUNTER
Pls see IC visit notes.  Pt feels she can be seen here for her earlobe repair.  IC recommended plastic surgeon.  Pls advise

## 2024-03-14 ENCOUNTER — TELEPHONE (OUTPATIENT)
Dept: FAMILY MEDICINE CLINIC | Facility: CLINIC | Age: 54
End: 2024-03-14

## 2024-03-14 DIAGNOSIS — Z12.31 ENCOUNTER FOR SCREENING MAMMOGRAM FOR BREAST CANCER: Primary | ICD-10-CM

## 2024-03-14 NOTE — TELEPHONE ENCOUNTER
Per 10/12/23 results:  RECOMMENDATIONS:    RETURN TO ANNUAL SCREENING MAMMMOGRAM IN SIX MONTHS. BILATERAL BREASTS     Okay for order?

## 2024-03-14 NOTE — TELEPHONE ENCOUNTER
Patient requesting order for Mammogram.  Stated she has to have it done every 6 months.  Please let her know when it is created.

## 2024-03-18 ENCOUNTER — HOSPITAL ENCOUNTER (OUTPATIENT)
Dept: MAMMOGRAPHY | Age: 54
Discharge: HOME | End: 2024-03-18
Attending: FAMILY MEDICINE
Payer: COMMERCIAL

## 2024-03-18 ENCOUNTER — LAB ENCOUNTER (OUTPATIENT)
Dept: LAB | Age: 54
End: 2024-03-18
Attending: FAMILY MEDICINE
Payer: COMMERCIAL

## 2024-03-18 DIAGNOSIS — Z12.31 ENCOUNTER FOR SCREENING MAMMOGRAM FOR BREAST CANCER: ICD-10-CM

## 2024-03-18 DIAGNOSIS — Z00.00 ANNUAL PHYSICAL EXAM: ICD-10-CM

## 2024-03-18 LAB
ALBUMIN SERPL-MCNC: 3.5 G/DL (ref 3.4–5)
ALBUMIN/GLOB SERPL: 0.9 {RATIO} (ref 1–2)
ALP LIVER SERPL-CCNC: 55 U/L
ALT SERPL-CCNC: 17 U/L
ANION GAP SERPL CALC-SCNC: 4 MMOL/L (ref 0–18)
AST SERPL-CCNC: 18 U/L (ref 15–37)
BASOPHILS # BLD AUTO: 0.06 X10(3) UL (ref 0–0.2)
BASOPHILS NFR BLD AUTO: 0.9 %
BILIRUB SERPL-MCNC: 0.5 MG/DL (ref 0.1–2)
BUN BLD-MCNC: 13 MG/DL (ref 9–23)
CALCIUM BLD-MCNC: 8.8 MG/DL (ref 8.5–10.1)
CHLORIDE SERPL-SCNC: 107 MMOL/L (ref 98–112)
CHOLEST SERPL-MCNC: 204 MG/DL (ref ?–200)
CO2 SERPL-SCNC: 27 MMOL/L (ref 21–32)
CREAT BLD-MCNC: 0.63 MG/DL
EGFRCR SERPLBLD CKD-EPI 2021: 106 ML/MIN/1.73M2 (ref 60–?)
EOSINOPHIL # BLD AUTO: 0.06 X10(3) UL (ref 0–0.7)
EOSINOPHIL NFR BLD AUTO: 0.9 %
ERYTHROCYTE [DISTWIDTH] IN BLOOD BY AUTOMATED COUNT: 13.8 %
EST. AVERAGE GLUCOSE BLD GHB EST-MCNC: 117 MG/DL (ref 68–126)
FASTING PATIENT LIPID ANSWER: NO
FASTING STATUS PATIENT QL REPORTED: NO
GLOBULIN PLAS-MCNC: 3.7 G/DL (ref 2.8–4.4)
GLUCOSE BLD-MCNC: 86 MG/DL (ref 70–99)
HBA1C MFR BLD: 5.7 % (ref ?–5.7)
HCT VFR BLD AUTO: 37.7 %
HDLC SERPL-MCNC: 79 MG/DL (ref 40–59)
HGB BLD-MCNC: 12.1 G/DL
IMM GRANULOCYTES # BLD AUTO: 0.04 X10(3) UL (ref 0–1)
IMM GRANULOCYTES NFR BLD: 0.6 %
LDLC SERPL CALC-MCNC: 116 MG/DL (ref ?–100)
LYMPHOCYTES # BLD AUTO: 3.12 X10(3) UL (ref 1–4)
LYMPHOCYTES NFR BLD AUTO: 46.4 %
MCH RBC QN AUTO: 28.1 PG (ref 26–34)
MCHC RBC AUTO-ENTMCNC: 32.1 G/DL (ref 31–37)
MCV RBC AUTO: 87.5 FL
MONOCYTES # BLD AUTO: 0.48 X10(3) UL (ref 0.1–1)
MONOCYTES NFR BLD AUTO: 7.1 %
NEUTROPHILS # BLD AUTO: 2.96 X10 (3) UL (ref 1.5–7.7)
NEUTROPHILS # BLD AUTO: 2.96 X10(3) UL (ref 1.5–7.7)
NEUTROPHILS NFR BLD AUTO: 44.1 %
NONHDLC SERPL-MCNC: 125 MG/DL (ref ?–130)
OSMOLALITY SERPL CALC.SUM OF ELEC: 285 MOSM/KG (ref 275–295)
PLATELET # BLD AUTO: 276 10(3)UL (ref 150–450)
POTASSIUM SERPL-SCNC: 3.8 MMOL/L (ref 3.5–5.1)
PROT SERPL-MCNC: 7.2 G/DL (ref 6.4–8.2)
RBC # BLD AUTO: 4.31 X10(6)UL
SODIUM SERPL-SCNC: 138 MMOL/L (ref 136–145)
T4 FREE SERPL-MCNC: 0.8 NG/DL (ref 0.8–1.7)
TRIGL SERPL-MCNC: 50 MG/DL (ref 30–149)
TSI SER-ACNC: 1.73 MIU/ML (ref 0.36–3.74)
VIT B12 SERPL-MCNC: 1081 PG/ML (ref 193–986)
VLDLC SERPL CALC-MCNC: 9 MG/DL (ref 0–30)
WBC # BLD AUTO: 6.7 X10(3) UL (ref 4–11)

## 2024-03-18 PROCEDURE — 84443 ASSAY THYROID STIM HORMONE: CPT

## 2024-03-18 PROCEDURE — 77063 BREAST TOMOSYNTHESIS BI: CPT | Performed by: FAMILY MEDICINE

## 2024-03-18 PROCEDURE — 83036 HEMOGLOBIN GLYCOSYLATED A1C: CPT

## 2024-03-18 PROCEDURE — 84439 ASSAY OF FREE THYROXINE: CPT

## 2024-03-18 PROCEDURE — 36415 COLL VENOUS BLD VENIPUNCTURE: CPT

## 2024-03-18 PROCEDURE — 80061 LIPID PANEL: CPT

## 2024-03-18 PROCEDURE — 77067 SCR MAMMO BI INCL CAD: CPT | Performed by: FAMILY MEDICINE

## 2024-03-18 PROCEDURE — 82607 VITAMIN B-12: CPT

## 2024-03-18 PROCEDURE — 80053 COMPREHEN METABOLIC PANEL: CPT

## 2024-03-18 PROCEDURE — 85025 COMPLETE CBC W/AUTO DIFF WBC: CPT

## 2024-03-25 ENCOUNTER — OFFICE VISIT (OUTPATIENT)
Dept: FAMILY MEDICINE CLINIC | Facility: CLINIC | Age: 54
End: 2024-03-25
Payer: COMMERCIAL

## 2024-03-25 VITALS
SYSTOLIC BLOOD PRESSURE: 124 MMHG | BODY MASS INDEX: 25.91 KG/M2 | HEIGHT: 61.25 IN | DIASTOLIC BLOOD PRESSURE: 72 MMHG | RESPIRATION RATE: 16 BRPM | WEIGHT: 139 LBS | OXYGEN SATURATION: 98 % | HEART RATE: 76 BPM

## 2024-03-25 DIAGNOSIS — G35 MS (MULTIPLE SCLEROSIS) (HCC): Primary | ICD-10-CM

## 2024-03-25 DIAGNOSIS — R73.9 HYPERGLYCEMIA: ICD-10-CM

## 2024-03-25 DIAGNOSIS — B00.9 HSV (HERPES SIMPLEX VIRUS) INFECTION: ICD-10-CM

## 2024-03-25 DIAGNOSIS — K59.09 CHRONIC CONSTIPATION: ICD-10-CM

## 2024-03-25 PROCEDURE — 99214 OFFICE O/P EST MOD 30 MIN: CPT | Performed by: FAMILY MEDICINE

## 2024-03-25 PROCEDURE — 3078F DIAST BP <80 MM HG: CPT | Performed by: FAMILY MEDICINE

## 2024-03-25 PROCEDURE — 3008F BODY MASS INDEX DOCD: CPT | Performed by: FAMILY MEDICINE

## 2024-03-25 PROCEDURE — 3074F SYST BP LT 130 MM HG: CPT | Performed by: FAMILY MEDICINE

## 2024-03-25 NOTE — PROGRESS NOTES
HPI:   Candida Thomason is a 53 year old female who presents for follow up on MMP     H/o chronic constipation   c-scope 2017     MS - due for MRI   Pt reports will see neuro in May   Pt denies neuro symptoms - no weakness/ pain nor tingling     More HSV outbreaks  Stressed about her mom     Needs yearly letter for work    Component      Latest Ref Rng 7/17/2020 4/29/2023 3/18/2024   Cholesterol, Total      <200 mg/dL 188  198  204 (H)    HDL Cholesterol      40 - 59 mg/dL 66 (H)  83 (H)  79 (H)    Triglycerides      30 - 149 mg/dL 60  38  50    LDL Cholesterol Calc      <100 mg/dL 110 (H)  108 (H)  116 (H)    VLDL      0 - 30 mg/dL 12  6  9    NON-HDL CHOLESTEROL      <130 mg/dL 122  115  125    Patient Fasting for Lipid?  Yes  No    HEMOGLOBIN A1c      <5.7 %  5.4  5.7 (H)    ESTIMATED AVERAGE GLUCOSE      68 - 126 mg/dL  108  117    VITAMIN D, 25-OH, TOTAL      30.0 - 100.0 ng/mL 18.0 (L)  83.6     T4,Free (Direct)      0.8 - 1.7 ng/dL  0.9  0.8    TSH      0.358 - 3.740 mIU/mL  0.850  1.730    Vitamin B12      193 - 986 pg/mL  1,058 (H)  1,081 (H)       Legend:  (H) High  (L) Low    Current Outpatient Medications   Medication Sig Dispense Refill    Turmeric (QC TUMERIC COMPLEX OR) Take by mouth.      Gluc-Chonn-MSM-Boswellia-Vit D (GLUCOSAMINE CHOND TRIPLE/VIT D) Oral Tab Take by mouth.      SUPER B COMPLEX/C OR Take by mouth.      Multiple Vitamins-Minerals (WOMENS ONE DAILY OR) Take by mouth daily.        Past Medical History:   Diagnosis Date    Arthritis     Back pain     Bloating     Constipation     Frequent use of laxatives     HSV (herpes simplex virus) anogenital infection     HSV infection 9/28/2018    MS (multiple sclerosis) ([facility]) 1999    MS (multiple sclerosis) ([facility]) 4/28/2017      Past Surgical History:   Procedure Laterality Date    EGD N/A 1/5/2017    Procedure: ESOPHAGOGASTRODUODENOSCOPY, COLONOSCOPY, POSSIBLE BIOPSY, POSSIBLE POLYPECTOMY 10388, 67081;  Surgeon: Jomar Gonzalez MD;  Location:  [facility], [facility]    HYSTERECTOMY  11/2012    DaVinci/No BSO      Family History   Problem Relation Age of Onset    Diabetes Mother     Hypertension Mother     Breast Cancer Mother 61      Social History:   Social History     Socioeconomic History    Marital status:    Tobacco Use    Smoking status: Never    Smokeless tobacco: Never   Vaping Use    Vaping Use: Never used   Substance and Sexual Activity    Alcohol use: No     Alcohol/week: 0.0 standard drinks of alcohol    Drug use: No    Sexual activity: Not Currently   Other Topics Concern    Blood Transfusions No    Caffeine Concern No    Exercise No   Social History Narrative    HISTORY OF SEXUAL ABUSE AT AGE 10     Occ: . : . Children: .   Exercise: minimal.  Diet: watches minimally     REVIEW OF SYSTEMS:   GENERAL: feels well otherwise  SKIN: denies any unusual skin lesions  EYES:denies blurred vision or double vision  HEENT: denies nasal congestion, sinus pain or ST  LUNGS: denies shortness of breath with exertion  CARDIOVASCULAR: denies chest pain on exertion  GI: denies abdominal pain,denies heartburn  : denies dysuria, vaginal discharge or itching  MUSCULOSKELETAL: denies back pain  NEURO: denies headaches  PSYCHE: denies depression or anxiety  HEMATOLOGIC: denies hx of anemia  ENDOCRINE: denies thyroid history  ALL/ASTHMA: denies hx of allergy or asthma    SEES NEURO FOR MS     EXAM:   /72   Pulse 76   Resp 16   Ht 5' 1.25\" (1.556 m)   Wt 139 lb (63 kg)   SpO2 98%   BMI 26.05 kg/m²   Body mass index is 26.05 kg/m².   GENERAL: alert and oriented X 3, well developed, well nourished,in no apparent distress  CARDIO: RRR without murmur  LUNGS: clear to auscultation  NECK: supple,no adenopathy,no thyromegaly  HEENT: atraumatic, normocephalic,ears and throat are clear  EYES:PERRLA, EOMI, normal,conjunctiva are clear  SKIN: norashes,no suspicious lesions  ABD: + BS soft NT ND no RRT   NEURO: cranial nerves are intact,motor and  sensory are intact    ASSESSMENT AND PLAN:   Candida Thomason is a 53 year old female who presents with     1. MS (multiple sclerosis) (HCC)    - MRI BRAIN (W+WO) (CPT=70553); Future    2. HSV (herpes simplex virus) infection      3. Chronic constipation      4. Hyperglycemia    - Hemoglobin A1C; Future  - DIETITIAN EDUCATION INITIAL, DIET (INTERNAL)    Questions answered and patient indicates understanding of these issues and agrees to the plan.  Follow up in 2-3 mo or sooner if needed .

## 2024-04-15 ENCOUNTER — LAB ENCOUNTER (OUTPATIENT)
Dept: LAB | Age: 54
End: 2024-04-15
Attending: FAMILY MEDICINE
Payer: COMMERCIAL

## 2024-04-15 ENCOUNTER — OFFICE VISIT (OUTPATIENT)
Dept: NEUROLOGY | Facility: CLINIC | Age: 54
End: 2024-04-15
Payer: COMMERCIAL

## 2024-04-15 VITALS
WEIGHT: 138.38 LBS | HEART RATE: 79 BPM | DIASTOLIC BLOOD PRESSURE: 70 MMHG | SYSTOLIC BLOOD PRESSURE: 108 MMHG | RESPIRATION RATE: 16 BRPM | BODY MASS INDEX: 26 KG/M2

## 2024-04-15 DIAGNOSIS — R73.9 HYPERGLYCEMIA: ICD-10-CM

## 2024-04-15 DIAGNOSIS — G35 MS (MULTIPLE SCLEROSIS) (HCC): Primary | ICD-10-CM

## 2024-04-15 LAB
EST. AVERAGE GLUCOSE BLD GHB EST-MCNC: 111 MG/DL (ref 68–126)
HBA1C MFR BLD: 5.5 % (ref ?–5.7)

## 2024-04-15 PROCEDURE — 3074F SYST BP LT 130 MM HG: CPT | Performed by: OTHER

## 2024-04-15 PROCEDURE — 36415 COLL VENOUS BLD VENIPUNCTURE: CPT

## 2024-04-15 PROCEDURE — 99213 OFFICE O/P EST LOW 20 MIN: CPT | Performed by: OTHER

## 2024-04-15 PROCEDURE — 3078F DIAST BP <80 MM HG: CPT | Performed by: OTHER

## 2024-04-15 PROCEDURE — 83036 HEMOGLOBIN GLYCOSYLATED A1C: CPT

## 2024-04-15 NOTE — PROGRESS NOTES
HPI:    Patient ID: Candida Thomason is a 53 year old female.    Multiple Sclerosis      Patient is a 53 year old female who presents for follow up for Multiple sclerosis.Last seen about a year ago and doing well. MS wise stable, has no new neurological symptoms. She elected not to be on any MS medications. Last medication tried Avonex.  Has another MRI scheduled later this month    MRI brain w and wo contrast- March 2023  1. No acute intracranial process.       2.  Stable moderate burden of supratentorial/infratentorial demyelinating disease consistent with multiple sclerosis.  There are no new lesions or areas of active demyelination.       HISTORY:  Past Medical History:    Arthritis    Back pain    Bloating    Constipation    Frequent use of laxatives    HSV (herpes simplex virus) anogenital infection    HSV infection    MS (multiple sclerosis) (HCC)    MS (multiple sclerosis) (HCC)      Past Surgical History:   Procedure Laterality Date    Egd N/A 1/5/2017    Procedure: ESOPHAGOGASTRODUODENOSCOPY, COLONOSCOPY, POSSIBLE BIOPSY, POSSIBLE POLYPECTOMY 79327, 31276;  Surgeon: Jomar Gonzalez MD;  Location: [facility] [place] [facility]    Hysterectomy  11/2012    DaVinci/No BSO      Family History   Problem Relation Age of Onset    Diabetes Mother     Hypertension Mother     Breast Cancer Mother 61      Social History     Socioeconomic History    Marital status:    Tobacco Use    Smoking status: Never    Smokeless tobacco: Never   Vaping Use    Vaping status: Never Used   Substance and Sexual Activity    Alcohol use: No     Alcohol/week: 0.0 standard drinks of alcohol    Drug use: No    Sexual activity: Not Currently   Other Topics Concern    Blood Transfusions No    Caffeine Concern No    Exercise No   Social History Narrative    HISTORY OF SEXUAL ABUSE AT AGE 10     Social Determinants of Health     Physical Activity: Sufficiently Active (9/23/2020)    Received from [employer] [employer]  [facility]    Exercise Vital Sign     Days of Exercise per Week: 7 days     Minutes of Exercise per Session: 30 min        Review of Systems   Constitutional: Negative.    HENT: Negative.     Eyes: Negative.    Respiratory: Negative.     Cardiovascular: Negative.    Gastrointestinal: Negative.    Endocrine: Negative.    Genitourinary: Negative.    Musculoskeletal: Negative.    Skin: Negative.    Allergic/Immunologic: Negative.    Neurological: Negative.    Hematological: Negative.    Psychiatric/Behavioral: Negative.     All other systems reviewed and are negative.           Current Outpatient Medications   Medication Sig Dispense Refill    Turmeric (QC TUMERIC COMPLEX OR) Take by mouth.      Gluc-Chonn-MSM-Boswellia-Vit D (GLUCOSAMINE CHOND TRIPLE/VIT D) Oral Tab Take by mouth.      SUPER B COMPLEX/C OR Take by mouth.      Multiple Vitamins-Minerals (WOMENS ONE DAILY OR) Take by mouth daily.       Allergies:  Allergies   Allergen Reactions    Sulfamethoxazole W/Trimethoprim SWELLING, SHORTNESS OF BREATH and HIVES     PHYSICAL EXAM:   Physical Exam  Blood pressure 108/70, pulse 79, resp. rate 16, weight 138 lb 6.4 oz (62.8 kg).    General Appearance: Well nourished, well developed, no apparent distress.   HEENT: Normocephalic and atraumatic.  Cardiovascular: Normal rate, regular rhythm and normal heart sounds.    Pulmonary/Chest: Effort normal and breath sounds normal.   Abdominal: Soft. Bowel sounds are normal.   Skin: dry, clean and intact  Ext: peripheral pulses present  Psych: normal mood and affect    Neurological:  Patient is awake, alert and oriented to person, place and time   Normal memory, attention/concentration, speech and language.    Cranial Nerves 2-12: grossly intact    Motor: Normal tone. Strength is  5 out of 5 in all extremities bilaterally.    Sensory: Sensory examination is normal to light touch and pinprick     Coordination: grossly intact    Gait: normal casual gait      ASSESSMENT/PLAN:        ICD-10-CM    1. MS (multiple sclerosis) (HCC)  G35           Clinically stable, almost in remission  MRI brain from March 2023 shows stable disease burden    Has a new MRI brain scheduled later this month    Repeat MRI brain in 2 years if clinical and radiographic stability     She is not on any MS medications. Patient elected not to be on any medications    Follow up in about 1 year    See orders and medications filed with this encounter. The patient indicates understanding of these issues and agrees with the plan.        No orders of the defined types were placed in this encounter.        Nova See MD  [facility]      Meds This Visit:  Requested Prescriptions      No prescriptions requested or ordered in this encounter       Imaging & Referrals:  None     ID#[id number]

## 2024-04-15 NOTE — PATIENT INSTRUCTIONS
Refill policies:    Allow 2-3 business days for refills; controlled substances may take longer.  Contact your pharmacy at least 5 days prior to running out of medication and have them send an electronic request or submit request through the “request refill” option in your [employer] account.  Refills are not addressed on weekends; covering physicians do not authorize routine medications on weekends.  No narcotics or controlled substances are refilled after noon on Fridays or by on call physicians.  By law, narcotics must be electronically prescribed.  A 30 day supply with no refills is the maximum allowed.  If your prescription is due for a refill, you may be due for a follow up appointment.  To best provide you care, patients receiving routine medications need to be seen at least once a year.  Patients receiving narcotic/controlled substance medications need to be seen at least once every 3 months.  In the event that your preferred pharmacy does not have the requested medication in stock (e.g. Backordered), it is your responsibility to find another pharmacy that has the requested medication available.  We will gladly send a new prescription to that pharmacy at your request.    Scheduling Tests:    If your physician has ordered radiology tests such as MRI or CT scans, please contact Central Scheduling at [phone] right away to schedule the test.  Once scheduled, the [facility] Centralized Referral Team will work with your insurance carrier to obtain pre-certification or prior authorization.  Depending on your insurance carrier, approval may take 3-10 days.  It is highly recommended patients assure they have received an authorization before having a test performed.  If test is done without insurance authorization, patient may be responsible for the entire amount billed.      Precertification and Prior Authorizations:  If your physician has recommended that you have a procedure or additional testing performed the [facility]  Centralized Referral Team will contact your insurance carrier to obtain pre-certification or prior authorization.    You are strongly encouraged to contact your insurance carrier to verify that your procedure/test has been approved and is a COVERED benefit.  Although the [facility] Centralized Referral Team does its due diligence, the insurance carrier gives the disclaimer that \"Although the procedure is authorized, this does not guarantee payment.\"    Ultimately the patient is responsible for payment.   Thank you for your understanding in this matter.  Paperwork Completion:  If you require FMLA or disability paperwork for your recovery, please make sure to either drop it off or have it faxed to our office at [phone]. Be sure the form has your name and date of birth on it.  The form will be faxed to our Forms Department and they will complete it for you.  There is a 25$ fee for all forms that need to be filled out.  Please be aware there is a 10-14 day turnaround time.  You will need to sign a release of information (FREDDY) form if your paperwork does not come with one.  You may call the Forms Department with any questions at [phone].  Their fax number is [phone].

## 2024-04-28 ENCOUNTER — HOSPITAL ENCOUNTER (OUTPATIENT)
Dept: MRI IMAGING | Age: 54
Discharge: HOME | End: 2024-04-28
Attending: FAMILY MEDICINE
Payer: COMMERCIAL

## 2024-04-28 ENCOUNTER — HOSPITAL ENCOUNTER (OUTPATIENT)
Dept: MRI IMAGING | Age: 54
End: 2024-04-28
Attending: FAMILY MEDICINE
Payer: COMMERCIAL

## 2024-04-28 DIAGNOSIS — G35 MS (MULTIPLE SCLEROSIS) (HCC): ICD-10-CM

## 2024-04-28 PROCEDURE — A9575 INJ GADOTERATE MEGLUMI 0.1ML: HCPCS | Performed by: FAMILY MEDICINE

## 2024-04-28 PROCEDURE — 70553 MRI BRAIN STEM W/O & W/DYE: CPT | Performed by: FAMILY MEDICINE

## 2024-04-28 RX ORDER — GADOTERATE MEGLUMINE 376.9 MG/ML
15 INJECTION INTRAVENOUS
Status: COMPLETED | OUTPATIENT
Start: 2024-04-28 | End: 2024-04-28

## 2024-04-28 RX ADMIN — GADOTERATE MEGLUMINE 12 ML: 376.9 INJECTION INTRAVENOUS at 15:36:00

## 2024-05-23 ENCOUNTER — TELEPHONE (OUTPATIENT)
Dept: FAMILY MEDICINE CLINIC | Facility: CLINIC | Age: 54
End: 2024-05-23

## 2024-05-23 NOTE — TELEPHONE ENCOUNTER
Pt states she had her wisdom teeth pulled on Saturday may 11th.   They gave her an antibiotic to start taking on Monday the 13th.     She states she finished it yesterday   She called the oral surgeon because they  are still irritated.   They prescribed a steroid for her to take dexamethazone  4 mg tablets   She is supposed to take 2 times a day x 3 days   She wants to know if there is a problem taking this with her health conditions.  Please advise    She has not started the steroid yet

## 2024-06-04 DIAGNOSIS — B00.9 HSV (HERPES SIMPLEX VIRUS) INFECTION: ICD-10-CM

## 2024-06-04 RX ORDER — VALACYCLOVIR HYDROCHLORIDE 1 G/1
1000 TABLET, FILM COATED ORAL EVERY 12 HOURS SCHEDULED
Qty: 14 TABLET | Refills: 0 | Status: SHIPPED | OUTPATIENT
Start: 2024-06-04 | End: 2024-06-11

## 2024-06-04 NOTE — TELEPHONE ENCOUNTER
valACYclovir 1 G Oral Tab     [id number] [address] AT [facility] [address], [phone], [phone]       Patient is having a breakout.  Last Office Visit 3-

## 2024-06-10 ENCOUNTER — OFFICE VISIT (OUTPATIENT)
Dept: FAMILY MEDICINE CLINIC | Facility: CLINIC | Age: 54
End: 2024-06-10
Payer: COMMERCIAL

## 2024-06-10 VITALS
HEART RATE: 65 BPM | WEIGHT: 137 LBS | DIASTOLIC BLOOD PRESSURE: 64 MMHG | SYSTOLIC BLOOD PRESSURE: 112 MMHG | OXYGEN SATURATION: 92 % | BODY MASS INDEX: 26 KG/M2

## 2024-06-10 DIAGNOSIS — G35 MS (MULTIPLE SCLEROSIS) (HCC): ICD-10-CM

## 2024-06-10 DIAGNOSIS — R73.9 HYPERGLYCEMIA: ICD-10-CM

## 2024-06-10 DIAGNOSIS — B00.9 HSV (HERPES SIMPLEX VIRUS) INFECTION: ICD-10-CM

## 2024-06-10 DIAGNOSIS — K59.09 CHRONIC CONSTIPATION: ICD-10-CM

## 2024-06-10 DIAGNOSIS — R21 RASH: ICD-10-CM

## 2024-06-10 DIAGNOSIS — R23.2 HOT FLASHES: Primary | ICD-10-CM

## 2024-06-10 PROCEDURE — 3078F DIAST BP <80 MM HG: CPT | Performed by: FAMILY MEDICINE

## 2024-06-10 PROCEDURE — 99214 OFFICE O/P EST MOD 30 MIN: CPT | Performed by: FAMILY MEDICINE

## 2024-06-10 PROCEDURE — 3074F SYST BP LT 130 MM HG: CPT | Performed by: FAMILY MEDICINE

## 2024-06-10 NOTE — PROGRESS NOTES
HPI:   Candida Thomason is a 53 year old female who presents for follow up on MMP     Pt c/o skin irritation around chin- feels it is related to her valtrex     Pt feels she is going through menopause - hot flashes   Mom with h/o breast cancer     H/o chronic constipation   c-scope      MS - due for MRI   Pt reports will see neuro in May   Pt denies neuro symptoms - no weakness/ pain nor tingling     More HSV outbreaks  Stressed about her mom     Pt had elevated glucose   Component      Latest Ref Rng 3/18/2024 4/15/2024   HEMOGLOBIN A1c      <5.7 % 5.7 (H)  5.5    ESTIMATED AVERAGE GLUCOSE      68 - 126 mg/dL 117  111       Legend:  (H) High    Current Outpatient Medications   Medication Sig Dispense Refill    valACYclovir 1 G Oral Tab Take 1 tablet (1,000 mg total) by mouth every 12 (twelve) hours for 7 days. 14 tablet 0    Turmeric (QC TUMERIC COMPLEX OR) Take by mouth.      Gluc-Chonn-MSM-Boswellia-Vit D (GLUCOSAMINE CHOND TRIPLE/VIT D) Oral Tab Take by mouth.      SUPER B COMPLEX/C OR Take by mouth.      Multiple Vitamins-Minerals (WOMENS ONE DAILY OR) Take by mouth daily.        Past Medical History:    Allergic rhinitis    Arthritis    Back pain    Bloating    Constipation    Frequent use of laxatives    HSV (herpes simplex virus) anogenital infection    HSV infection    MS (multiple sclerosis) (HCC)    MS (multiple sclerosis) (HCC)      Past Surgical History:   Procedure Laterality Date    Egd N/A 2017    Procedure: ESOPHAGOGASTRODUODENOSCOPY, COLONOSCOPY, POSSIBLE BIOPSY, POSSIBLE POLYPECTOMY 14964, 73771;  Surgeon: Jomar Gonzalez MD;  Location: [facility] [place] [facility]    Hysterectomy  2012    DaVinci/No BSO      1990      Family History   Problem Relation Age of Onset    Diabetes Mother     Hypertension Mother     Breast Cancer Mother 61    Cancer Mother     Stroke Mother     Stroke Paternal Grandmother       Social History:   Social History     Socioeconomic History    Marital  status:    Tobacco Use    Smoking status: Never    Smokeless tobacco: Never   Vaping Use    Vaping status: Never Used   Substance and Sexual Activity    Alcohol use: No    Drug use: No    Sexual activity: Not Currently   Other Topics Concern    Blood Transfusions No    Caffeine Concern No    Stress Concern No    Weight Concern No    Special Diet Yes     Comment: Not so much a special diet, not eating meat    Exercise Yes     Comment: Walking,  Stretches,  Squats    Seat Belt No   Social History Narrative    HISTORY OF SEXUAL ABUSE AT AGE 10     Social Determinants of Health     Physical Activity: Sufficiently Active (9/23/2020)    Received [facility] [employer]    Exercise Vital Sign     Days of Exercise per Week: 7 days     Minutes of Exercise per Session: 30 min     Occ: . : . Children: .   Exercise: minimal.  Diet: watches minimally     REVIEW OF SYSTEMS:   GENERAL: feels well otherwise  SKIN: denies any unusual skin lesions  EYES:denies blurred vision or double vision  HEENT: denies nasal congestion, sinus pain or ST  LUNGS: denies shortness of breath with exertion  CARDIOVASCULAR: denies chest pain on exertion  GI: denies abdominal pain,denies heartburn  : denies dysuria, vaginal discharge or itching  MUSCULOSKELETAL: denies back pain  NEURO: denies headaches  PSYCHE: denies depression or anxiety  HEMATOLOGIC: denies hx of anemia  ENDOCRINE: denies thyroid history  ALL/ASTHMA: denies hx of allergy or asthma    SEES NEURO FOR MS     EXAM:   /64   Pulse 65   Wt 137 lb (62.1 kg)   SpO2 92%   BMI 25.68 kg/m²   Body mass index is 25.68 kg/m².   GENERAL: alert and oriented X 3, well developed, well nourished,in no apparent distress  CARDIO: RRR without murmur  LUNGS: clear to auscultation  NECK: supple,no adenopathy,no thyromegaly  HEENT: atraumatic, normocephalic,ears and throat are clear  EYES:PERRLA, EOMI, normal,conjunctiva are clear  SKIN: SOME FLESH  COLORED 3MM PAPULES TO CHIN / NO VESICLES / NO PUSTULES   ABD: + BS soft NT ND no RRT   NEURO: cranial nerves are intact,motor and sensory are intact    ASSESSMENT AND PLAN:   Candida Thomason is a 53 year old female who presents with     1. Hot flashes  Discussed at length     2. Hyperglycemia  Repeat in 3 mo   - Hemoglobin A1C; Future    3. Rash  Discussed med SE   - fluocinonide 0.05 % External Cream; Apply 1 Application topically 2 (two) times daily.  Dispense: 15 g; Refill: 0    4. MS (multiple sclerosis) (HCC)  Stable / see neuro     5. Chronic constipation  Stable monitor     6. HSV (herpes simplex virus) infection  Meds prn       Questions answered and patient indicates understanding of these issues and agrees to the plan.  Follow up in 3 mo or sooner if needed .

## 2024-09-28 ENCOUNTER — TELEMEDICINE (OUTPATIENT)
Dept: FAMILY MEDICINE CLINIC | Facility: CLINIC | Age: 54
End: 2024-09-28
Payer: COMMERCIAL

## 2024-09-28 DIAGNOSIS — J30.2 SEASONAL ALLERGIES: Primary | ICD-10-CM

## 2024-09-28 PROCEDURE — 99213 OFFICE O/P EST LOW 20 MIN: CPT | Performed by: FAMILY MEDICINE

## 2024-09-28 RX ORDER — FLUTICASONE PROPIONATE 50 MCG
2 SPRAY, SUSPENSION (ML) NASAL NIGHTLY
Qty: 3 EACH | Refills: 0 | Status: SHIPPED | OUTPATIENT
Start: 2024-09-28

## 2024-09-28 NOTE — PROGRESS NOTES
This visit is conducted using Telemedicine with live, interactive video and audio.      [facility]  Telehealth Verbal Consent   I conducted a telehealth visit with Candida Thomason today, 09/28/24, which was completed using two-way, real-time interactive audio and video communication. This has been done in good joann to provide continuity of care in the best interest of the provider-patient relationship, due to the COVID -19 public health crisis/national emergency where restrictions of face-to-face office visits are ongoing. Every conscious effort was taken to allow for sufficient and adequate time to complete the visit.  The patient was made aware of the limitations of the telehealth visit, including treatment limitations as no physical exam could be performed.  The patient was advised to call 911 or to go to the ER in case there was an emergency.  The patient was also advised of the potential privacy & security concerns related to the telehealth platform.   The patient was made aware of where to find [facility]'s notice of privacy practices, telehealth consent form and other related consent forms and documents.  which are located on the [facility] website. The patient verbally agreed to telehealth consent form, related consents and the risks discussed.    Lastly, the patient confirmed that they were in [place].   Included in this visit, time may have been spent reviewing labs, medications, radiology tests and decision making. Appropriate medical decision-making and tests are ordered as detailed in the plan of care above.  Coding/billing information is submitted for this visit based on complexity of care and/or time spent for the visit.      HPI:   Candida Thomason is a 54 year old female who presents with congestion     Pt reports she has been congested  No covid testing   Pt feels it happened this same time last year  Using nyquil   No fever   No sinus pain   Using a sinus rinse       Current Outpatient  Medications   Medication Sig Dispense Refill    fluocinonide 0.05 % External Cream Apply 1 Application topically 2 (two) times daily. 15 g 0    Turmeric (QC TUMERIC COMPLEX OR) Take by mouth.      Gluc-Chonn-MSM-Boswellia-Vit D (GLUCOSAMINE CHOND TRIPLE/VIT D) Oral Tab Take by mouth.      SUPER B COMPLEX/C OR Take by mouth.      Multiple Vitamins-Minerals (WOMENS ONE DAILY OR) Take by mouth daily.        Past Medical History:    Allergic rhinitis    Arthritis    Back pain    Bloating    Constipation    Frequent use of laxatives    HSV (herpes simplex virus) anogenital infection    HSV infection    MS (multiple sclerosis) (HCC)    MS (multiple sclerosis) (HCC)      Past Surgical History:   Procedure Laterality Date    Egd N/A 2017    Procedure: ESOPHAGOGASTRODUODENOSCOPY, COLONOSCOPY, POSSIBLE BIOPSY, POSSIBLE POLYPECTOMY 94039, 15491;  Surgeon: Jomar Gonzalez MD;  [place] [facility] [place] [facility]    Hysterectomy  2012    DaVinci/No BSO      1990      Family History   Problem Relation Age of Onset    Diabetes Mother     Hypertension Mother     Breast Cancer Mother 61    Cancer Mother     Stroke Mother     Stroke Paternal Grandmother       Social History:   Social History     Socioeconomic History    Marital status:    Tobacco Use    Smoking status: Never    Smokeless tobacco: Never   Vaping Use    Vaping status: Never Used   Substance and Sexual Activity    Alcohol use: No    Drug use: No    Sexual activity: Not Currently   Other Topics Concern    Blood Transfusions No    Caffeine Concern No    Stress Concern No    Weight Concern No    Special Diet Yes     Comment: Not so much a special diet, not eating meat    Exercise Yes     Comment: Walking,  Stretches,  Squats    Seat Belt No   Social History Narrative    HISTORY OF SEXUAL ABUSE AT AGE 10     Social Determinants of Health     Physical Activity: Sufficiently Active (2020)    Received from [employer]  [facility]    Exercise Vital Sign     Days of Exercise per Week: 7 days     Minutes of Exercise per Session: 30 min     Occ: . : . Children: .   Exercise: minimal.  Diet: watches minimally     REVIEW OF SYSTEMS:   GENERAL: feels well otherwise  SKIN: denies any unusual skin lesions  EYES:denies blurred vision or double vision  HEENT: denies nasal congestion, sinus pain or ST  LUNGS: denies shortness of breath with exertion  CARDIOVASCULAR: denies chest pain on exertion  GI: denies abdominal pain,denies heartburn  : denies dysuria, vaginal discharge or itching  MUSCULOSKELETAL: denies back pain  NEURO: denies headaches  PSYCHE: denies depression or anxiety  HEMATOLOGIC: denies hx of anemia  ENDOCRINE: denies thyroid history  ALL/ASTHMA: denies hx of allergy or asthma    SEES NEURO FOR MS     EXAM:   alert, appears stated age and cooperative, Normocephalic, without obvious abnormality, atraumatic, lips, mucosa, and tongue normal; teeth and gums normal, Speaking in full sentences comfortably, Normal work of breathing, Skin color, texture, turgor normal. No rashes or lesions and age appropriate, normal, logical connections, person, place and time/date and no suicidal ideation      ASSESSMENT AND PLAN:   Candida Thomason is a 54 year old female who presents with     1. Seasonal allergies  Afrin for today only / has an event / discussed SE   - fluticasone propionate 50 MCG/ACT Nasal Suspension; 2 sprays by Nasal route nightly.  Dispense: 3 each; Refill: 0  - Allergens, Zone 8 [E]; Future      Questions answered and patient indicates understanding of these issues and agrees to the plan.  Follow up in 3 mo or sooner if needed .

## 2024-11-19 ENCOUNTER — TELEPHONE (OUTPATIENT)
Dept: FAMILY MEDICINE CLINIC | Facility: CLINIC | Age: 54
End: 2024-11-19

## 2024-11-19 RX ORDER — HYDROCORTISONE 25 MG/G
1 CREAM TOPICAL 2 TIMES DAILY
Qty: 1 EACH | Refills: 3 | Status: SHIPPED | OUTPATIENT
Start: 2024-11-19

## 2025-02-14 ENCOUNTER — HOSPITAL ENCOUNTER (OUTPATIENT)
Age: 55
Discharge: HOME | End: 2025-02-14
Payer: COMMERCIAL

## 2025-02-14 VITALS
RESPIRATION RATE: 20 BRPM | HEIGHT: 60 IN | SYSTOLIC BLOOD PRESSURE: 151 MMHG | OXYGEN SATURATION: 97 % | WEIGHT: 135 LBS | DIASTOLIC BLOOD PRESSURE: 66 MMHG | BODY MASS INDEX: 26.5 KG/M2 | HEART RATE: 68 BPM | TEMPERATURE: 98 F

## 2025-02-14 DIAGNOSIS — J34.89 NASAL CONGESTION WITH RHINORRHEA: ICD-10-CM

## 2025-02-14 DIAGNOSIS — R09.81 NASAL CONGESTION WITH RHINORRHEA: ICD-10-CM

## 2025-02-14 DIAGNOSIS — T24.011A BURN OF RIGHT THIGH, UNSPECIFIED BURN DEGREE, INITIAL ENCOUNTER: Primary | ICD-10-CM

## 2025-02-14 PROCEDURE — 90471 IMMUNIZATION ADMIN: CPT

## 2025-02-14 PROCEDURE — 99213 OFFICE O/P EST LOW 20 MIN: CPT

## 2025-02-14 RX ORDER — MUPIROCIN 20 MG/G
1 OINTMENT TOPICAL 3 TIMES DAILY
Qty: 1 EACH | Refills: 0 | Status: SHIPPED | OUTPATIENT
Start: 2025-02-14 | End: 2025-02-28

## 2025-02-14 RX ORDER — CETIRIZINE HYDROCHLORIDE 10 MG/1
10 TABLET ORAL DAILY
Qty: 30 TABLET | Refills: 0 | Status: SHIPPED | OUTPATIENT
Start: 2025-02-14 | End: 2025-03-16

## 2025-02-14 NOTE — ED INITIAL ASSESSMENT (HPI)
Has had cough and congestion for a few days, last night she was using a vaporizer at home and the hot water splashed on right thigh

## 2025-02-14 NOTE — ED PROVIDER NOTES
Patient Seen in: [facility]      History     Chief Complaint   Patient presents with    Burn    Cough/URI     Stated Complaint: blisters on right thigh/congestion    Subjective:   HPI      54-year-old female here with several concerns:  Patient reports that she has had a nonproductive cough with some nasal congestion for the past couple days.  Patient denies chest pain, shortness of breath, abdominal pain, nausea, vomiting or diarrhea.  Patient is tolerating p.o. speaking full sentences.  Patient states that she spilled some hot water on her right thigh and has a couple lesions.  Patient denies any further injury trauma.Afebrile.      Objective:     Past Medical History:    Allergic rhinitis    Arthritis    Back pain    Bloating    Constipation    Frequent use of laxatives    HSV (herpes simplex virus) anogenital infection    HSV infection    MS (multiple sclerosis) (HCC)    MS (multiple sclerosis) (HCC)              Past Surgical History:   Procedure Laterality Date    Egd N/A 2017    Procedure: ESOPHAGOGASTRODUODENOSCOPY, COLONOSCOPY, POSSIBLE BIOPSY, POSSIBLE POLYPECTOMY 70110, 71543;  Surgeon: Jomar Gonzalez MD;  [place] [facility] [place] [facility]    Hysterectomy  2012    DaVinci/No BSO      1990              The patient's medication list, past medical history and social history elements  as listed in today's nurse's notes are reviewed and agree.   The patient's family history is reviewed and is noncontributory to the presenting problem, except as indicated as above.     Social History     Socioeconomic History    Marital status:    Tobacco Use    Smoking status: Never    Smokeless tobacco: Never   Vaping Use    Vaping status: Never Used   Substance and Sexual Activity    Alcohol use: No    Drug use: No    Sexual activity: Not Currently   Other Topics Concern    Blood Transfusions No    Caffeine Concern No    Stress Concern No    Weight Concern No    Special Diet  Yes     Comment: Not so much a special diet, not eating meat    Exercise Yes     Comment: Walking,  Stretches,  Squats    Seat Belt No   Social History Narrative    HISTORY OF SEXUAL ABUSE AT AGE 10              Review of Systems    Positive for stated complaint: blisters on right thigh/congestion  Other systems are as noted in HPI.  Constitutional and vital signs reviewed.      All other systems reviewed and negative except as noted above.    Physical Exam     ED Triage Vitals [02/14/25 1119]   /66   Pulse 68   Resp 20   Temp 98.3 °F (36.8 °C)   Temp src Oral   SpO2 97 %   O2 Device None (Room air)       Current Vitals:   Vital Signs  BP: 151/66  Pulse: 68  Resp: 20  Temp: 98.3 °F (36.8 °C)  Temp src: Oral    Oxygen Therapy  SpO2: 97 %  O2 Device: None (Room air)        Physical Exam  Vitals and nursing note reviewed.   Constitutional:       Appearance: Normal appearance. She is well-developed.   HENT:      Head: Normocephalic.      Jaw: There is normal jaw occlusion.      Right Ear: External ear normal.      Left Ear: External ear normal.      Nose: Congestion and rhinorrhea present. Rhinorrhea is clear.      Mouth/Throat:      Lips: Pink.      Mouth: Mucous membranes are moist.      Pharynx: Oropharynx is clear.      Comments: Uvula midline: No trismus or drooling: No peritonsillar abscess noted moderate cobblestoning in the posterior pharynx.  Eyes:      Conjunctiva/sclera: Conjunctivae normal.      Pupils: Pupils are equal, round, and reactive to light.   Cardiovascular:      Rate and Rhythm: Normal rate and regular rhythm.      Heart sounds: Normal heart sounds.   Pulmonary:      Effort: Pulmonary effort is normal.      Breath sounds: Normal breath sounds.      Comments: Lungs clear thru out  Musculoskeletal:      Cervical back: Normal range of motion and neck supple.   Skin:     General: Skin is warm.      Findings: Rash present. Rash is crusting and scaling.      Comments: R thigh: several crust/scaly  lesions: no intact blisters: no erythema warmth fluctuance induration or streaking noted   Neurological:      Mental Status: She is alert and oriented to person, place, and time.   Psychiatric:         Behavior: Behavior normal.         Thought Content: Thought content normal.         Judgment: Judgment normal.           ED Course                   MDM   Clinical Impression: R thigh burn 1st/2nd degree/rhinorrhea with nasal congestion  Course of Treatment:   Recommend taking an over the counter antihistamine daily: IE zyrtec/claritin.  Sleep more upright. Use chloraseptic spray to help stop the cough trigger reflex.  Push fluids and gargle with warm saline rinses.   Icing the right thigh.  Apply the mupirocin to the sores as well as underneath the nasal passages.  Follow-up with your primary care physician for further evaluation and treatment.    The patient is encouraged to return if any concerning symptoms arise. Additional verbal discharge instructions are given and discussed. Discharge medications are discussed. The patient is in good condition throughout the visit today and remains so upon discharge. I discuss the plan of care with the patient, who expresses understanding. All questions and concerns are addressed to the patient's satisfaction prior to discharge today.  Previous conversations with PCP and charts were reviewed.                Disposition and Plan     Clinical Impression:  1. Burn of right thigh, unspecified burn degree, initial encounter    2. Nasal congestion with rhinorrhea         Disposition:  Discharge  2/14/2025 12:11 pm    Follow-up:  Lizy Gerardo DO  [address] [place] [id number]  [phone]                Medications Prescribed:  Current Discharge Medication List        START taking these medications    Details   mupirocin 2 % External Ointment Apply 1 Application topically 3 (three) times daily for 14 days.  Qty: 1 each, Refills: 0      cetirizine 10 MG Oral Tab Take 1  tablet (10 mg total) by mouth daily.  Qty: 30 tablet, Refills: 0                 Supplementary Documentation:

## 2025-02-14 NOTE — DISCHARGE INSTRUCTIONS
Please return to the ER/clinic if symptoms worsen. Follow-up with your PCP in 24-48 hours as needed.    Recommend taking an over the counter antihistamine daily: IE zyrtec/claritin.  Sleep more upright. Use chloraseptic spray to help stop the cough trigger reflex.  Push fluids and gargle with warm saline rinses.   Icing the right thigh.  Apply the mupirocin to the sores as well as underneath the nasal passages.  Follow-up with your primary care physician for further evaluation and treatment.

## 2025-02-17 ENCOUNTER — TELEPHONE (OUTPATIENT)
Dept: FAMILY MEDICINE CLINIC | Facility: CLINIC | Age: 55
End: 2025-02-17

## 2025-02-17 DIAGNOSIS — Z12.31 ENCOUNTER FOR SCREENING MAMMOGRAM FOR BREAST CANCER: Primary | ICD-10-CM

## 2025-02-17 NOTE — TELEPHONE ENCOUNTER
Patient called asking for a referral for a mammogram.      Please call [phone] patient when the referral is submitted.    Please advise

## 2025-02-19 ENCOUNTER — TELEPHONE (OUTPATIENT)
Dept: FAMILY MEDICINE CLINIC | Facility: CLINIC | Age: 55
End: 2025-02-19

## 2025-02-19 NOTE — TELEPHONE ENCOUNTER
Pt asking for nurse to call to discuss meds prescribed at the IC.  Pt has appt with Dr. Gerardo on 2/25

## 2025-02-24 ENCOUNTER — OFFICE VISIT (OUTPATIENT)
Dept: FAMILY MEDICINE CLINIC | Facility: CLINIC | Age: 55
End: 2025-02-24
Payer: COMMERCIAL

## 2025-02-24 ENCOUNTER — LAB ENCOUNTER (OUTPATIENT)
Dept: LAB | Age: 55
End: 2025-02-24
Attending: FAMILY MEDICINE
Payer: COMMERCIAL

## 2025-02-24 VITALS
BODY MASS INDEX: 25.91 KG/M2 | HEIGHT: 60 IN | WEIGHT: 132 LBS | RESPIRATION RATE: 16 BRPM | OXYGEN SATURATION: 97 % | HEART RATE: 78 BPM

## 2025-02-24 DIAGNOSIS — J30.2 SEASONAL ALLERGIES: ICD-10-CM

## 2025-02-24 DIAGNOSIS — M54.12 CERVICAL RADICULAR PAIN: ICD-10-CM

## 2025-02-24 DIAGNOSIS — R73.9 HYPERGLYCEMIA: ICD-10-CM

## 2025-02-24 DIAGNOSIS — Z00.00 ANNUAL PHYSICAL EXAM: ICD-10-CM

## 2025-02-24 DIAGNOSIS — G35 MS (MULTIPLE SCLEROSIS) (HCC): Primary | ICD-10-CM

## 2025-02-24 LAB
EST. AVERAGE GLUCOSE BLD GHB EST-MCNC: 117 MG/DL (ref 68–126)
HBA1C MFR BLD: 5.7 % (ref ?–5.7)

## 2025-02-24 PROCEDURE — 36415 COLL VENOUS BLD VENIPUNCTURE: CPT

## 2025-02-24 PROCEDURE — 82785 ASSAY OF IGE: CPT

## 2025-02-24 PROCEDURE — 86003 ALLG SPEC IGE CRUDE XTRC EA: CPT

## 2025-02-24 PROCEDURE — 83036 HEMOGLOBIN GLYCOSYLATED A1C: CPT

## 2025-02-24 RX ORDER — METFORMIN HYDROCHLORIDE 500 MG/1
500 TABLET, EXTENDED RELEASE ORAL 2 TIMES DAILY WITH MEALS
Qty: 180 TABLET | Refills: 0 | Status: SHIPPED | OUTPATIENT
Start: 2025-02-24

## 2025-02-24 RX ORDER — FLUTICASONE PROPIONATE 50 MCG
2 SPRAY, SUSPENSION (ML) NASAL NIGHTLY
Qty: 3 EACH | Refills: 0 | Status: SHIPPED | OUTPATIENT
Start: 2025-02-24

## 2025-02-24 NOTE — PROGRESS NOTES
"SW called in [facility], faxed in [facility]--awaiting 142.     Your fax has been successfully sent to [phone] at [phone].  ------------------------------------------------------------  From: [id number]  ------------------------------------------------------------  11/22/2024 10:45:41 AM Transmission Record          Sent to [phone] with remote ID "Fax "          Result: (0/339;0/0) Success          Page record: 1 - 6          Elapsed time: 02:22 on channel 19   " HPI:   Candida Thomason is a 54 year old female who presents for a complete physical exam.       Wt Readings from Last 6 Encounters:   02/24/25 132 lb (59.9 kg)   02/14/25 135 lb (61.2 kg)   06/10/24 137 lb (62.1 kg)   04/15/24 138 lb 6.4 oz (62.8 kg)   03/25/24 139 lb (63 kg)   02/20/24 130 lb (59 kg)     Body mass index is 25.78 kg/m².     Cholesterol, Total (mg/dL)   Date Value   03/18/2024 204 (H)   04/29/2023 198   07/17/2020 188     HDL Cholesterol (mg/dL)   Date Value   03/18/2024 79 (H)   04/29/2023 83 (H)   07/17/2020 66 (H)     LDL Cholesterol (mg/dL)   Date Value   03/18/2024 116 (H)   04/29/2023 108 (H)   07/17/2020 110 (H)     AST (U/L)   Date Value   03/18/2024 18   04/29/2023 15   07/17/2020 12 (L)     ALT (U/L)   Date Value   03/18/2024 17   04/29/2023 15   07/17/2020 16     last pap- years   Partial hysterectomy   all previous paps normal  No vaginal discharge  No bladder dysfunction  No performing self breast exams  last mammogram- scheduled   c-scope - 2017  dexa - 2024   No calcium and vit D supplementation  No family history of colon or ovarian cancer  Mom with breast cancer     \"Zaps of pain into left arm\" no neck pain   No weakness   H/o MS  Brain MRI due    Current Outpatient Medications   Medication Sig Dispense Refill    mupirocin 2 % External Ointment Apply 1 Application topically 3 (three) times daily for 14 days. 1 each 0    hydrocortisone (PROCTOZONE-HC) 2.5 % External Cream Place 1 Application  rectally 2 (two) times daily. For 1-2 weeks only 1 each 3    fluocinonide 0.05 % External Cream Apply 1 Application topically 2 (two) times daily. 15 g 0    Turmeric (QC TUMERIC COMPLEX OR) Take by mouth.      Gluc-Chonn-MSM-Boswellia-Vit D (GLUCOSAMINE CHOND TRIPLE/VIT D) Oral Tab Take by mouth.      SUPER B COMPLEX/C OR Take by mouth.      Multiple Vitamins-Minerals (WOMENS ONE DAILY OR) Take by mouth daily.      cetirizine 10 MG Oral Tab Take 1 tablet (10 mg total) by mouth daily. (Patient  not taking: Reported on 2025) 30 tablet 0    fluticasone propionate 50 MCG/ACT Nasal Suspension 2 sprays by Nasal route nightly. (Patient not taking: Reported on 2025) 3 each 0      Past Medical History:    Allergic rhinitis    Arthritis    Back pain    Bloating    Constipation    Frequent use of laxatives    HSV (herpes simplex virus) anogenital infection    HSV infection    MS (multiple sclerosis) (HCC)    MS (multiple sclerosis) (HCC)      Past Surgical History:   Procedure Laterality Date    Egd N/A 2017    Procedure: ESOPHAGOGASTRODUODENOSCOPY, COLONOSCOPY, POSSIBLE BIOPSY, POSSIBLE POLYPECTOMY 98359, 79802;  Surgeon: Jomar Gonzalez MD;  Location: [facility] [place] [facility], [facility]    Hysterectomy  2012    DaVinci/No BSO      1990      Family History   Problem Relation Age of Onset    Diabetes Mother     Hypertension Mother     Breast Cancer Mother 61    Cancer Mother     Stroke Mother     Stroke Paternal Grandmother       Social History:   Social History     Socioeconomic History    Marital status:    Tobacco Use    Smoking status: Never    Smokeless tobacco: Never   Vaping Use    Vaping status: Never Used   Substance and Sexual Activity    Alcohol use: No    Drug use: No    Sexual activity: Not Currently   Other Topics Concern    Blood Transfusions No    Caffeine Concern No    Stress Concern No    Weight Concern No    Special Diet Yes     Comment: Not so much a special diet, not eating meat    Exercise Yes     Comment: Walking,  Stretches,  Squats    Seat Belt No   Social History Narrative    HISTORY OF SEXUAL ABUSE AT AGE 10     Occ: . : . Children: .   Exercise: yes Diet: watches minimally     REVIEW OF SYSTEMS:   GENERAL: feels well otherwise  SKIN: denies any unusual skin lesions  EYES:denies blurred vision or double vision  HEENT: denies nasal congestion, sinus pain or ST  LUNGS: denies shortness of breath with exertion  CARDIOVASCULAR: denies chest pain on  exertion  GI: denies abdominal pain,denies heartburn  : denies dysuria, vaginal discharge or itching  MUSCULOSKELETAL: denies back pain  NEURO: denies headaches  PSYCHE: denies depression or anxiety  HEMATOLOGIC: denies hx of anemia  ENDOCRINE: denies thyroid history  ALL/ASTHMA: denies hx of allergy or asthma    SEES NEURO FOR MS     EXAM:   Pulse 78   Resp 16   Ht 5' (1.524 m)   Wt 132 lb (59.9 kg)   SpO2 97%   BMI 25.78 kg/m²   Body mass index is 25.78 kg/m².   GENERAL: alert and oriented X 3, well developed, well nourished,in no apparent distress  CARDIO: RRR without murmur  LUNGS: clear to auscultation  NECK: supple,no adenopathy,no thyromegaly  HEENT: atraumatic, normocephalic,ears and throat are clear  EYES:PERRLA, EOMI, normal,conjunctiva are clear  SKIN: norashes,no suspicious lesions  GI: good BS's,no masses, HSM or tenderness  CHEST: no chest tenderness  MUSCULOSKELETAL: back is not tender,FROM of the back  Left arm: no reproducible pain   EXTREMITIES: no cyanosis, clubbing or edema  NEURO: cranial nerves are intact,motor and sensory are grossly intact    ASSESSMENT AND PLAN:   Candida Thomason is a 54 year old female who presents with     1. Seasonal allergies    - fluticasone propionate 50 MCG/ACT Nasal Suspension; 2 sprays by Nasal route nightly.  Dispense: 3 each; Refill: 0  - OP [facility] Referral - Internal    2. Annual physical exam    - Free T4, (Free Thyroxine); Future  - Assay, Thyroid Stim Hormone; Future  - Lipid Panel; Future  - CBC With Differential With Platelet; Future  - Vitamin B12; Future  - Comp Metabolic Panel (14); Future  - Vitamin D [E]; Future  - Hemoglobin A1C; Future  - OP [facility] Referral - Internal    3. MS (multiple sclerosis) (HCC)    - MRI BRAIN (W+WO) (CPT=70553); Future  - OP [facility] Referral - Internal  - MRI SPINE CERVICAL (W+WO) (CPT=72156); Future    4. Cervical radicular pain    - MRI SPINE CERVICAL (W+WO) (CPT=72156);  Future    5. Hyperglycemia    - metFORMIN  MG Oral Tablet 24 Hr; Take 1 tablet (500 mg total) by mouth 2 (two) times daily with meals.  Dispense: 180 tablet; Refill: 0        Discussed diet, exercise,calcium, vitamin D, fish oil and self breast exams.   Questions answered and patient indicates understanding of these issues and agrees to the plan.  Follow up in 1 year or sooner if needed .

## 2025-02-25 LAB

## 2025-03-31 ENCOUNTER — HOSPITAL ENCOUNTER (OUTPATIENT)
Dept: MAMMOGRAPHY | Age: 55
Discharge: HOME | End: 2025-03-31
Attending: FAMILY MEDICINE
Payer: COMMERCIAL

## 2025-03-31 ENCOUNTER — APPOINTMENT (OUTPATIENT)
Dept: OTHER | Facility: HOSPITAL | Age: 55
End: 2025-03-31
Attending: FAMILY MEDICINE
Payer: COMMERCIAL

## 2025-03-31 ENCOUNTER — TELEPHONE (OUTPATIENT)
Dept: FAMILY MEDICINE CLINIC | Facility: CLINIC | Age: 55
End: 2025-03-31

## 2025-03-31 DIAGNOSIS — Z12.31 ENCOUNTER FOR SCREENING MAMMOGRAM FOR BREAST CANCER: ICD-10-CM

## 2025-03-31 PROCEDURE — 77067 SCR MAMMO BI INCL CAD: CPT | Performed by: FAMILY MEDICINE

## 2025-03-31 PROCEDURE — 77063 BREAST TOMOSYNTHESIS BI: CPT | Performed by: FAMILY MEDICINE

## 2025-03-31 NOTE — TELEPHONE ENCOUNTER
Pt calling and requesting a letter from Dr. Gerardo that she is ok to work with no restrictions.     She said Dr. Gerardo has given her this letter in the past   Pt had px with LE on 02/25/25    She is having her work px today at [facility] at 1:30    Pt is hoping to get the letter by then   Please advise

## 2025-03-31 NOTE — TELEPHONE ENCOUNTER
See below. Request is a bit last minute.    I pended letter you did for her on this 3/24/24 if you agree?    Please approve if appropriate. Thanks.

## 2025-04-01 ENCOUNTER — HOSPITAL ENCOUNTER (OUTPATIENT)
Dept: MRI IMAGING | Age: 55
Discharge: HOME | End: 2025-04-01
Attending: FAMILY MEDICINE
Payer: COMMERCIAL

## 2025-04-01 ENCOUNTER — APPOINTMENT (OUTPATIENT)
Dept: MRI IMAGING | Facility: HOSPITAL | Age: 55
End: 2025-04-01
Attending: FAMILY MEDICINE
Payer: COMMERCIAL

## 2025-04-01 DIAGNOSIS — G35 MS (MULTIPLE SCLEROSIS) (HCC): ICD-10-CM

## 2025-04-01 DIAGNOSIS — M54.12 CERVICAL RADICULAR PAIN: ICD-10-CM

## 2025-04-01 PROCEDURE — A9575 INJ GADOTERATE MEGLUMI 0.1ML: HCPCS | Performed by: FAMILY MEDICINE

## 2025-04-01 PROCEDURE — 70553 MRI BRAIN STEM W/O & W/DYE: CPT | Performed by: FAMILY MEDICINE

## 2025-04-01 PROCEDURE — 72156 MRI NECK SPINE W/O & W/DYE: CPT | Performed by: FAMILY MEDICINE

## 2025-04-01 RX ORDER — GADOTERATE MEGLUMINE 376.9 MG/ML
13 INJECTION INTRAVENOUS
Status: COMPLETED | OUTPATIENT
Start: 2025-04-01 | End: 2025-04-01

## 2025-04-01 RX ADMIN — GADOTERATE MEGLUMINE 13 ML: 376.9 INJECTION INTRAVENOUS at 15:33:00

## 2025-04-02 DIAGNOSIS — R92.30 DENSE BREASTS: Primary | ICD-10-CM

## 2025-04-02 DIAGNOSIS — B00.9 HSV (HERPES SIMPLEX VIRUS) INFECTION: ICD-10-CM

## 2025-04-02 DIAGNOSIS — G35 MS (MULTIPLE SCLEROSIS) (HCC): Primary | ICD-10-CM

## 2025-04-02 DIAGNOSIS — Z12.39 SCREENING FOR BREAST CANCER USING NON-MAMMOGRAM MODALITY: ICD-10-CM

## 2025-04-02 NOTE — TELEPHONE ENCOUNTER
Pt calling and wants me to let Dr. Gerardo know she is having an outbreak and needs medication.  Please advise

## 2025-04-02 NOTE — TELEPHONE ENCOUNTER
Called patient - has an outbreak of genital herpes. Asking for refill of Valacyclovir 1 gram  Last refill -  6/4/24.  Last office visit - 2/24/25    Medication pended

## 2025-04-03 RX ORDER — VALACYCLOVIR HYDROCHLORIDE 1 G/1
1000 TABLET, FILM COATED ORAL EVERY 12 HOURS SCHEDULED
Qty: 14 TABLET | Refills: 0 | Status: SHIPPED | OUTPATIENT
Start: 2025-04-03 | End: 2025-04-10

## 2025-05-02 NOTE — TELEPHONE ENCOUNTER
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

## 2025-06-10 ENCOUNTER — OFFICE VISIT (OUTPATIENT)
Dept: FAMILY MEDICINE CLINIC | Facility: CLINIC | Age: 55
End: 2025-06-10
Payer: COMMERCIAL

## 2025-06-10 ENCOUNTER — LAB ENCOUNTER (OUTPATIENT)
Dept: LAB | Age: 55
End: 2025-06-10
Attending: FAMILY MEDICINE
Payer: COMMERCIAL

## 2025-06-10 VITALS
DIASTOLIC BLOOD PRESSURE: 64 MMHG | HEIGHT: 60 IN | BODY MASS INDEX: 25.72 KG/M2 | SYSTOLIC BLOOD PRESSURE: 110 MMHG | WEIGHT: 131 LBS | RESPIRATION RATE: 16 BRPM | OXYGEN SATURATION: 98 % | HEART RATE: 64 BPM

## 2025-06-10 DIAGNOSIS — R73.9 HYPERGLYCEMIA: ICD-10-CM

## 2025-06-10 DIAGNOSIS — Z00.00 ANNUAL PHYSICAL EXAM: ICD-10-CM

## 2025-06-10 DIAGNOSIS — K59.09 CHRONIC CONSTIPATION: ICD-10-CM

## 2025-06-10 DIAGNOSIS — G35 MS (MULTIPLE SCLEROSIS) (HCC): Primary | ICD-10-CM

## 2025-06-10 LAB
ALBUMIN SERPL-MCNC: 4.8 G/DL (ref 3.2–4.8)
ALBUMIN/GLOB SERPL: 1.6 {RATIO} (ref 1–2)
ALP LIVER SERPL-CCNC: 64 U/L (ref 41–108)
ALT SERPL-CCNC: 13 U/L (ref 10–49)
ANION GAP SERPL CALC-SCNC: 9 MMOL/L (ref 0–18)
AST SERPL-CCNC: 22 U/L (ref ?–34)
BASOPHILS # BLD AUTO: 0.07 X10(3) UL (ref 0–0.2)
BASOPHILS NFR BLD AUTO: 0.8 %
BILIRUB SERPL-MCNC: 0.4 MG/DL (ref 0.3–1.2)
BUN BLD-MCNC: 14 MG/DL (ref 9–23)
CALCIUM BLD-MCNC: 9.8 MG/DL (ref 8.7–10.6)
CHLORIDE SERPL-SCNC: 102 MMOL/L (ref 98–112)
CHOLEST SERPL-MCNC: 218 MG/DL (ref ?–200)
CO2 SERPL-SCNC: 28 MMOL/L (ref 21–32)
CREAT BLD-MCNC: 0.78 MG/DL (ref 0.55–1.02)
EGFRCR SERPLBLD CKD-EPI 2021: 90 ML/MIN/1.73M2 (ref 60–?)
EOSINOPHIL # BLD AUTO: 0.11 X10(3) UL (ref 0–0.7)
EOSINOPHIL NFR BLD AUTO: 1.3 %
ERYTHROCYTE [DISTWIDTH] IN BLOOD BY AUTOMATED COUNT: 13.7 %
EST. AVERAGE GLUCOSE BLD GHB EST-MCNC: 105 MG/DL (ref 68–126)
FASTING PATIENT LIPID ANSWER: NO
FASTING STATUS PATIENT QL REPORTED: NO
GLOBULIN PLAS-MCNC: 3 G/DL (ref 2–3.5)
GLUCOSE BLD-MCNC: 85 MG/DL (ref 70–99)
HBA1C MFR BLD: 5.3 % (ref ?–5.7)
HCT VFR BLD AUTO: 38.8 % (ref 35–48)
HDLC SERPL-MCNC: 84 MG/DL (ref 40–59)
HGB BLD-MCNC: 12.8 G/DL (ref 12–16)
IMM GRANULOCYTES # BLD AUTO: 0.03 X10(3) UL (ref 0–1)
IMM GRANULOCYTES NFR BLD: 0.4 %
LDLC SERPL CALC-MCNC: 117 MG/DL (ref ?–100)
LYMPHOCYTES # BLD AUTO: 4.44 X10(3) UL (ref 1–4)
LYMPHOCYTES NFR BLD AUTO: 52.4 %
MCH RBC QN AUTO: 27.8 PG (ref 26–34)
MCHC RBC AUTO-ENTMCNC: 33 G/DL (ref 31–37)
MCV RBC AUTO: 84.3 FL (ref 80–100)
MONOCYTES # BLD AUTO: 0.57 X10(3) UL (ref 0.1–1)
MONOCYTES NFR BLD AUTO: 6.7 %
NEUTROPHILS # BLD AUTO: 3.26 X10 (3) UL (ref 1.5–7.7)
NEUTROPHILS # BLD AUTO: 3.26 X10(3) UL (ref 1.5–7.7)
NEUTROPHILS NFR BLD AUTO: 38.4 %
NONHDLC SERPL-MCNC: 134 MG/DL (ref ?–130)
OSMOLALITY SERPL CALC.SUM OF ELEC: 288 MOSM/KG (ref 275–295)
PLATELET # BLD AUTO: 318 10(3)UL (ref 150–450)
POTASSIUM SERPL-SCNC: 4.4 MMOL/L (ref 3.5–5.1)
PROT SERPL-MCNC: 7.8 G/DL (ref 5.7–8.2)
RBC # BLD AUTO: 4.6 X10(6)UL (ref 3.8–5.3)
SODIUM SERPL-SCNC: 139 MMOL/L (ref 136–145)
T4 FREE SERPL-MCNC: 1.2 NG/DL (ref 0.8–1.7)
TRIGL SERPL-MCNC: 100 MG/DL (ref 30–149)
TSI SER-ACNC: 1.61 UIU/ML (ref 0.55–4.78)
VIT B12 SERPL-MCNC: 1277 PG/ML (ref 211–911)
VIT D+METAB SERPL-MCNC: 100.4 NG/ML (ref 30–100)
VLDLC SERPL CALC-MCNC: 17 MG/DL (ref 0–30)
WBC # BLD AUTO: 8.5 X10(3) UL (ref 4–11)

## 2025-06-10 PROCEDURE — 84439 ASSAY OF FREE THYROXINE: CPT

## 2025-06-10 PROCEDURE — 36415 COLL VENOUS BLD VENIPUNCTURE: CPT

## 2025-06-10 PROCEDURE — 83036 HEMOGLOBIN GLYCOSYLATED A1C: CPT

## 2025-06-10 PROCEDURE — 82306 VITAMIN D 25 HYDROXY: CPT

## 2025-06-10 PROCEDURE — 3078F DIAST BP <80 MM HG: CPT | Performed by: FAMILY MEDICINE

## 2025-06-10 PROCEDURE — 80061 LIPID PANEL: CPT

## 2025-06-10 PROCEDURE — 3074F SYST BP LT 130 MM HG: CPT | Performed by: FAMILY MEDICINE

## 2025-06-10 PROCEDURE — 82607 VITAMIN B-12: CPT

## 2025-06-10 PROCEDURE — 84443 ASSAY THYROID STIM HORMONE: CPT

## 2025-06-10 PROCEDURE — 3008F BODY MASS INDEX DOCD: CPT | Performed by: FAMILY MEDICINE

## 2025-06-10 PROCEDURE — 99214 OFFICE O/P EST MOD 30 MIN: CPT | Performed by: FAMILY MEDICINE

## 2025-06-10 PROCEDURE — 85025 COMPLETE CBC W/AUTO DIFF WBC: CPT

## 2025-06-10 PROCEDURE — 80053 COMPREHEN METABOLIC PANEL: CPT

## 2025-06-10 NOTE — PROGRESS NOTES
HPI:   Candida Thomason is a 54 year old female who presents for follow up on MMP     SE on metformin - stopped it / unpredictable BM's    H/o chronic constipation   c-scope      Pt has lost weight   Working out and eating better       MS - discussed MRI of neck and brain  Will see neuro soon  Pt denies neuro symptoms - no weakness/ pain nor tingling     More HSV outbreaks  Stressed about her mom     Labs due       Current Outpatient Medications   Medication Sig Dispense Refill    fluticasone propionate 50 MCG/ACT Nasal Suspension 2 sprays by Nasal route nightly. 3 each 0    hydrocortisone (PROCTOZONE-HC) 2.5 % External Cream Place 1 Application  rectally 2 (two) times daily. For 1-2 weeks only 1 each 3    fluocinonide 0.05 % External Cream Apply 1 Application topically 2 (two) times daily. 15 g 0    Turmeric (QC TUMERIC COMPLEX OR) Take by mouth.      Gluc-Chonn-MSM-Boswellia-Vit D (GLUCOSAMINE CHOND TRIPLE/VIT D) Oral Tab Take by mouth.      SUPER B COMPLEX/C OR Take by mouth.      Multiple Vitamins-Minerals (WOMENS ONE DAILY OR) Take by mouth daily.      metFORMIN  MG Oral Tablet 24 Hr Take 1 tablet (500 mg total) by mouth 2 (two) times daily with meals. 180 tablet 0      Past Medical History:    Allergic rhinitis    Arthritis    Back pain    Bloating    Constipation    Frequent use of laxatives    HSV (herpes simplex virus) anogenital infection    HSV infection    MS (multiple sclerosis) (HCC)    MS (multiple sclerosis) (HCC)      Past Surgical History:   Procedure Laterality Date    Egd N/A 2017    Procedure: ESOPHAGOGASTRODUODENOSCOPY, COLONOSCOPY, POSSIBLE BIOPSY, POSSIBLE POLYPECTOMY 44404, 10863;  Surgeon: Jomar Gonzalez MD;  [place] [facility] [place] [facility], [facility]    Hysterectomy  2012    DaVinci/No BSO      1990      Family History   Problem Relation Age of Onset    Diabetes Mother     Hypertension Mother     Breast Cancer Mother 61    Cancer Mother     Stroke Mother     Stroke  Paternal Grandmother       Social History:   Social History     Socioeconomic History    Marital status:    Tobacco Use    Smoking status: Never    Smokeless tobacco: Never   Vaping Use    Vaping status: Never Used   Substance and Sexual Activity    Alcohol use: No    Drug use: No    Sexual activity: Not Currently   Other Topics Concern    Blood Transfusions No    Caffeine Concern No    Stress Concern No    Weight Concern No    Special Diet Yes     Comment: Not so much a special diet, not eating meat    Exercise Yes     Comment: Walking,  Stretches,  Squats    Seat Belt No   Social History Narrative    HISTORY OF SEXUAL ABUSE AT AGE 10     Occ: . : . Children: .   Exercise: minimal.  Diet: watches minimally     REVIEW OF SYSTEMS:   GENERAL: feels well otherwise  SKIN: denies any unusual skin lesions  EYES:denies blurred vision or double vision  HEENT: denies nasal congestion, sinus pain or ST  LUNGS: denies shortness of breath with exertion  CARDIOVASCULAR: denies chest pain on exertion  GI: denies abdominal pain,denies heartburn  : denies dysuria, vaginal discharge or itching  MUSCULOSKELETAL: denies back pain  NEURO: denies headaches  PSYCHE: denies depression or anxiety  HEMATOLOGIC: denies hx of anemia  ENDOCRINE: denies thyroid history  ALL/ASTHMA: denies hx of allergy or asthma    SEES NEURO FOR MS     EXAM:   /64   Pulse 64   Resp 16   Ht 5' (1.524 m)   Wt 131 lb (59.4 kg)   SpO2 98%   BMI 25.58 kg/m²   Body mass index is 25.58 kg/m².   GENERAL: alert and oriented X 3, well developed, well nourished,in no apparent distress  CARDIO: RRR without murmur  LUNGS: clear to auscultation  NECK: supple,no adenopathy,no thyromegaly  HEENT: atraumatic, normocephalic,ears and throat are clear  EYES:PERRLA, EOMI, normal,conjunctiva are clear  SKIN: normal  ABD: + BS soft NT ND no RRT   NEURO: cranial nerves are intact,motor and sensory are intact    ASSESSMENT AND PLAN:   Candida Thomason is a  54 year old female who presents with     1. MS (multiple sclerosis) (HCC)  See neuro     2. Chronic constipation  Monitor     3. Hyperglycemia  Labs due       Questions answered and patient indicates understanding of these issues and agrees to the plan.  Follow up in 3 mo or sooner if needed .

## 2025-06-13 ENCOUNTER — OFFICE VISIT (OUTPATIENT)
Dept: NEUROLOGY | Facility: CLINIC | Age: 55
End: 2025-06-13
Payer: COMMERCIAL

## 2025-06-13 VITALS
RESPIRATION RATE: 16 BRPM | HEART RATE: 77 BPM | DIASTOLIC BLOOD PRESSURE: 70 MMHG | SYSTOLIC BLOOD PRESSURE: 108 MMHG | BODY MASS INDEX: 26 KG/M2 | WEIGHT: 132 LBS

## 2025-06-13 DIAGNOSIS — G35 MS (MULTIPLE SCLEROSIS) (HCC): Primary | ICD-10-CM

## 2025-06-13 PROCEDURE — 3078F DIAST BP <80 MM HG: CPT | Performed by: OTHER

## 2025-06-13 PROCEDURE — 3074F SYST BP LT 130 MM HG: CPT | Performed by: OTHER

## 2025-06-13 PROCEDURE — 99214 OFFICE O/P EST MOD 30 MIN: CPT | Performed by: OTHER

## 2025-06-13 NOTE — PATIENT INSTRUCTIONS
Refill policies:    Allow 2-3 business days for refills; controlled substances may take longer.  Contact your pharmacy at least 5 days prior to running out of medication and have them send an electronic request or submit request through the “request refill” option in your [facility] account.  Refills are not addressed on weekends; covering physicians do not authorize routine medications on weekends.  No narcotics or controlled substances are refilled after noon on Fridays or by on call physicians.  By law, narcotics must be electronically prescribed.  A 30 day supply with no refills is the maximum allowed.  If your prescription is due for a refill, you may be due for a follow up appointment.  To best provide you care, patients receiving routine medications need to be seen at least once a year.  Patients receiving narcotic/controlled substance medications need to be seen at least once every 3 months.  In the event that your preferred pharmacy does not have the requested medication in stock (e.g. Backordered), it is your responsibility to find another pharmacy that has the requested medication available.  We will gladly send a new prescription to that pharmacy at your request.    Scheduling Tests:    If your physician has ordered radiology tests such as MRI or CT scans, please contact Central Scheduling at [phone] right away to schedule the test.  Once scheduled, the [facility] Centralized Referral Team will work with your insurance carrier to obtain pre-certification or prior authorization.  Depending on your insurance carrier, approval may take 3-10 days.  It is highly recommended patients assure they have received an authorization before having a test performed.  If test is done without insurance authorization, patient may be responsible for the entire amount billed.      Precertification and Prior Authorizations:  If your physician has recommended that you have a procedure or additional testing performed the [facility]  Centralized Referral Team will contact your insurance carrier to obtain pre-certification or prior authorization.    You are strongly encouraged to contact your insurance carrier to verify that your procedure/test has been approved and is a COVERED benefit.  Although the [facility] Centralized Referral Team does its due diligence, the insurance carrier gives the disclaimer that \"Although the procedure is authorized, this does not guarantee payment.\"    Ultimately the patient is responsible for payment.   Thank you for your understanding in this matter.  Paperwork Completion:  If you require FMLA or disability paperwork for your recovery, please make sure to either drop it off or have it faxed to our office at [phone]. Be sure the form has your name and date of birth on it.  The form will be faxed to our Forms Department and they will complete it for you.  There is a 25$ fee for all forms that need to be filled out.  Please be aware there is a 10-14 day turnaround time.  You will need to sign a release of information (FREDDY) form if your paperwork does not come with one.  You may call the Forms Department with any questions at [phone].  Their fax number is [phone].

## 2025-06-13 NOTE — PROGRESS NOTES
HPI:    Patient ID: Candida Thomason is a 54 year old female.    Neurologic Problem    Multiple Sclerosis      Patient is a 54 year old female who presents for follow up for Multiple sclerosis.Last seen about a year ago and doing well. MS wise stable, elected not to be on any MS medications. Last medication tried Avonex.      Had an episode of shooting pain in LUE arm down to forearm, had 2-3 episode in few months. No arm numbness or weakness or any ataxia    PCP ordered MRI brain and cervical w and wo contrast which shows stable demyelinating brain lesions and right dorsal lesion in cervical cord at C5. No abnormal enchancement seen.      HISTORY:  Past Medical History:    Allergic rhinitis    Arthritis    Back pain    Bloating    Constipation    Frequent use of laxatives    HSV (herpes simplex virus) anogenital infection    HSV infection    MS (multiple sclerosis) (HCC)    MS (multiple sclerosis) (HCC)      Past Surgical History:   Procedure Laterality Date    Egd N/A 2017    Procedure: ESOPHAGOGASTRODUODENOSCOPY, COLONOSCOPY, POSSIBLE BIOPSY, POSSIBLE POLYPECTOMY 36096, 45919;  Surgeon: Jomar Gonzalez MD;  Location: [facility] [place]    Hysterectomy  2012    DaVinci/No BSO      1990      Family History   Problem Relation Age of Onset    Diabetes Mother     Hypertension Mother     Breast Cancer Mother 61    Cancer Mother     Stroke Mother     Stroke Paternal Grandmother       Social History     Socioeconomic History    Marital status:    Tobacco Use    Smoking status: Never    Smokeless tobacco: Never   Vaping Use    Vaping status: Never Used   Substance and Sexual Activity    Alcohol use: No    Drug use: No    Sexual activity: Not Currently   Other Topics Concern    Blood Transfusions No    Caffeine Concern No    Stress Concern No    Weight Concern No    Special Diet Yes     Comment: Not so much a special diet, not eating meat    Exercise Yes     Comment: Walking,  Stretches,   Squats    Seat Belt No   Social History Narrative    HISTORY OF SEXUAL ABUSE AT AGE 10        Review of Systems   Constitutional: Negative.    HENT: Negative.     Eyes: Negative.    Respiratory: Negative.     Cardiovascular: Negative.    Gastrointestinal: Negative.    Endocrine: Negative.    Genitourinary: Negative.    Musculoskeletal: Negative.    Skin: Negative.    Allergic/Immunologic: Negative.    Neurological: Negative.    Hematological: Negative.    Psychiatric/Behavioral: Negative.     All other systems reviewed and are negative.           Current Outpatient Medications   Medication Sig Dispense Refill    fluticasone propionate 50 MCG/ACT Nasal Suspension 2 sprays by Nasal route nightly. 3 each 0    metFORMIN  MG Oral Tablet 24 Hr Take 1 tablet (500 mg total) by mouth 2 (two) times daily with meals. 180 tablet 0    hydrocortisone (PROCTOZONE-HC) 2.5 % External Cream Place 1 Application  rectally 2 (two) times daily. For 1-2 weeks only 1 each 3    fluocinonide 0.05 % External Cream Apply 1 Application topically 2 (two) times daily. 15 g 0    Turmeric (QC TUMERIC COMPLEX OR) Take by mouth.      Gluc-Chonn-MSM-Boswellia-Vit D (GLUCOSAMINE CHOND TRIPLE/VIT D) Oral Tab Take by mouth.      SUPER B COMPLEX/C OR Take by mouth.      Multiple Vitamins-Minerals (WOMENS ONE DAILY OR) Take by mouth daily.       Allergies:  Allergies   Allergen Reactions    Sulfamethoxazole W/Trimethoprim SWELLING, SHORTNESS OF BREATH and HIVES     PHYSICAL EXAM:   Physical Exam  Blood pressure 108/70, pulse 77, resp. rate 16, weight 132 lb (59.9 kg).    General Appearance: Well nourished, well developed, no apparent distress.   HEENT: Normocephalic and atraumatic.  Cardiovascular: Normal rate, regular rhythm and normal heart sounds.    Pulmonary/Chest: Effort normal and breath sounds normal.   Abdominal: Soft. Bowel sounds are normal.   Skin: dry, clean and intact  Ext: peripheral pulses present  Psych: normal mood and  affect    Neurological:  Patient is awake, alert and oriented to person, place and time   Normal memory, attention/concentration, speech and language.    Cranial Nerves 2-12: grossly intact    Motor: Normal tone. Strength is  5 out of 5 in all extremities bilaterally.    Sensory: Sensory examination is normal to light touch and pinprick     Coordination: grossly intact    Gait: normal casual gait          IMAGING         Impression   CONCLUSION:    1. Multilevel mild degenerative disc disease is described in detail level by level above.  2. Right dorsal lateral T2 hyperintensity in cervical cord near C5 is noted.  There are other subtle areas of scattered mild STIR hyperintensity noted.  Imaging findings would be consistent with the stated history of demyelinating disease.  There is no  abnormal enhancement to suggest active demyelination             Impression   CONCLUSION:  Stable supra tentorial and infra tentorial demyelinating disease.  No abnormal enhancement.  Otherwise stable MRI imaging of the brain with contrast.                ASSESSMENT/PLAN:       ICD-10-CM    1. MS (multiple sclerosis) ([facility])  G35 MRI SPINE CERVICAL (W+WO) (CPT=72156)            Clinically stable, almost in remission  MRI brain w and wo contrast from April 2025 shows stable disease burden  MRI cervical spine w and wo contrast shows a faint small probable chronic demyelinating lesion in the right dorsal cord at C5 level  It does not explain the LUE shooting pain, symptoms more suspicious for left cervical radiculopathy     Repeat MRI cervical next year to assess stability     She is not on any MS medications. Patient elected not to be on any medications    Follow up in about 6-12 months or sooner if necessary    See orders and medications filed with this encounter. The patient indicates understanding of these issues and agrees with the plan.        No orders of the defined types were placed in this encounter.        Nova See,  MD  [facility]        Meds This Visit:  Requested Prescriptions      No prescriptions requested or ordered in this encounter       Imaging & Referrals:  None     ID#[id number]

## 2025-06-25 ENCOUNTER — TELEMEDICINE (OUTPATIENT)
Dept: FAMILY MEDICINE CLINIC | Facility: CLINIC | Age: 55
End: 2025-06-25
Payer: COMMERCIAL

## 2025-06-25 DIAGNOSIS — R21 RASH: Primary | ICD-10-CM

## 2025-06-25 PROCEDURE — 98005 SYNCH AUDIO-VIDEO EST LOW 20: CPT | Performed by: FAMILY MEDICINE

## 2025-06-25 NOTE — PROGRESS NOTES
This visit is conducted using Telemedicine with live, interactive video and audio.    Telehealth outside of [facility]  Telehealth Verbal Consent   I conducted a telehealth visit with Candida Thomason today, 06/25/25, which was completed using two-way, real-time interactive audio and video communication. This has been done in good joann to provide continuity of care in the best interest of the provider-patient relationship, due to the COVID - public health crisis/national emergency where restrictions of face-to-face office visits are ongoing. Every conscious effort was taken to allow for sufficient and adequate time to complete the visit.  The patient was made aware of the limitations of the telehealth visit, including treatment limitations as no physical exam could be performed.  The patient was advised to call 911 or to go to the ER in case there was an emergency.  The patient was also advised of the potential privacy & security concerns related to the telehealth platform.   The patient was made aware of where to find [facility]'s notice of privacy practices, telehealth consent form and other related consent forms and documents.  which are located on the [facility] website. The patient verbally agreed to telehealth consent form, related consents and the risks discussed.    Lastly, the patient confirmed that they were in [place].   Included in this visit, time may have been spent reviewing labs, medications, radiology tests and decision making. Appropriate medical decision-making and tests are ordered as detailed in the plan of care above.  Coding/billing information is submitted for this visit based on complexity of care and/or time spent for the visit.      HPI:   Candida Thomason is a 54 year old female who presents with rash     Rash of arm and leg  Started Saturday   Started with right forearm Saturday and then with left leg yesterday    Pt was outdoors on Saturday     It has been itchy   Better with otc steroid cream    Getting better   Worried about shingles   No fever      Current Outpatient Medications   Medication Sig Dispense Refill    fluticasone propionate 50 MCG/ACT Nasal Suspension 2 sprays by Nasal route nightly. 3 each 0    metFORMIN  MG Oral Tablet 24 Hr Take 1 tablet (500 mg total) by mouth 2 (two) times daily with meals. 180 tablet 0    hydrocortisone (PROCTOZONE-HC) 2.5 % External Cream Place 1 Application  rectally 2 (two) times daily. For 1-2 weeks only 1 each 3    fluocinonide 0.05 % External Cream Apply 1 Application topically 2 (two) times daily. 15 g 0    Turmeric (QC TUMERIC COMPLEX OR) Take by mouth.      Gluc-Chonn-MSM-Boswellia-Vit D (GLUCOSAMINE CHOND TRIPLE/VIT D) Oral Tab Take by mouth.      SUPER B COMPLEX/C OR Take by mouth.      Multiple Vitamins-Minerals (WOMENS ONE DAILY OR) Take by mouth daily.        Past Medical History:    Allergic rhinitis    Arthritis    Back pain    Bloating    Constipation    Frequent use of laxatives    HSV (herpes simplex virus) anogenital infection    HSV infection    MS (multiple sclerosis) (HCC)    MS (multiple sclerosis) (HCC)      Past Surgical History:   Procedure Laterality Date    Egd N/A 2017    Procedure: ESOPHAGOGASTRODUODENOSCOPY, COLONOSCOPY, POSSIBLE BIOPSY, POSSIBLE POLYPECTOMY 64676, 42678;  Surgeon: Jomar Gonzalez MD;  Location: [facility]    Hysterectomy  2012    Roshan/Eri RAMIREZO      1990      Family History   Problem Relation Age of Onset    Diabetes Mother     Hypertension Mother     Breast Cancer Mother 61    Cancer Mother     Stroke Mother     Stroke Paternal Grandmother       Social History:   Social History     Socioeconomic History    Marital status:    Tobacco Use    Smoking status: Never    Smokeless tobacco: Never   Vaping Use    Vaping status: Never Used   Substance and Sexual Activity    Alcohol use: No    Drug use: No    Sexual activity: Not Currently   Other Topics Concern    Blood  Transfusions No    Caffeine Concern No    Stress Concern No    Weight Concern No    Special Diet Yes     Comment: Not so much a special diet, not eating meat    Exercise Yes     Comment: Walking,  Stretches,  Squats    Seat Belt No   Social History Narrative    HISTORY OF SEXUAL ABUSE AT AGE 10     Occ: . : . Children: .   Exercise: minimal.  Diet: watches minimally     REVIEW OF SYSTEMS:   GENERAL: feels well otherwise  SKIN: denies any unusual skin lesions  EYES:denies blurred vision or double vision  HEENT: denies nasal congestion, sinus pain or ST  LUNGS: denies shortness of breath with exertion  CARDIOVASCULAR: denies chest pain on exertion  GI: denies abdominal pain,denies heartburn  : denies dysuria, vaginal discharge or itching  MUSCULOSKELETAL: denies back pain  NEURO: denies headaches  PSYCHE: denies depression or anxiety  HEMATOLOGIC: denies hx of anemia  ENDOCRINE: denies thyroid history  ALL/ASTHMA: denies hx of allergy or asthma    SEES NEURO FOR MS     EXAM:   alert, appears stated age and cooperative, Normocephalic, without obvious abnormality, atraumatic, lips, mucosa, and tongue normal; teeth and gums normal, Speaking in full sentences comfortably, Normal work of breathing, Skin color, texture, turgor normal.  normal, logical connections, person, place and time/date and no suicidal ideation    Forearm and leg - series of 4mm red papular lesions/ no vesicles no pustules       ASSESSMENT AND PLAN:   Candida Thomason is a 54 year old female who presents with     1. Rash  Not consistent with shingles  Suspect insect bites   Ok for otc steroid for 7-10 days   Am clariitn if needed  Pm benadryl if needed     Questions answered and patient indicates understanding of these issues and agrees to the plan.  Follow up in 3 mo or sooner if needed .

## 2025-07-15 ENCOUNTER — TELEPHONE (OUTPATIENT)
Dept: FAMILY MEDICINE CLINIC | Facility: CLINIC | Age: 55
End: 2025-07-15

## 2025-07-15 NOTE — TELEPHONE ENCOUNTER
S/w pt  Questions answered regarding vit d dosing (see result note)  She voiced understanding and her thanks

## 2025-08-19 ENCOUNTER — TELEPHONE (OUTPATIENT)
Dept: NEUROLOGY | Facility: CLINIC | Age: 55
End: 2025-08-19

## (undated) DIAGNOSIS — Z11.52 ENCOUNTER FOR SCREENING FOR COVID-19: Primary | ICD-10-CM

## (undated) NOTE — LETTER
Date: 3/31/2025    Patient Name: Candida Thomason          To Whom it may concern:    This letter has been written at the patient's request. The above patient was seen at [facility] for treatment of a medical condition.    Candida may work and drive without restrictions.         Sincerely,          Lizy Gerardo, DO

## (undated) NOTE — LETTER
08/13/19    Dear Lima Slaughter,    We are contacting you from Dr. Gui Robbins office. Your health is important to us.   Therefore, we are sending this friendly reminder that you have the following overdue labs and/or tests which were ordered at your last office v

## (undated) NOTE — LETTER
Date: 8/7/2017    Patient Name: Romel Valdez          To Whom it may concern: This letter has been written at the patient's request. The above patient has been under my care for Multiple sclerosis.  She is currently asymptomatic and neurologically s

## (undated) NOTE — LETTER
Date: 9/28/2018    Patient Name: Dione Fiore          To Whom it may concern: The above patient was seen at the [facility] for treatment of a medical condition.     This patient should be excused from attending work/school from 9/28/1

## (undated) NOTE — LETTER
01/29/18        [facility] [place] [address]      Dear Ariane Neither,    [facility] [facility] records indicate that you have outstanding lab work and or testing that was ordered for you and has not yet been completed:          Vitamin D, 25-Hydroxy

## (undated) NOTE — Clinical Note
Date: 2017    Patient Name: Esteban Del Valle   : 1970          To Whom it may concern: This letter has been written at the patient's request. The above patient is under my care for Multiple sclerosis.  She is asymptomatic and neurologically

## (undated) NOTE — LETTER
Date & Time: 9/27/2018, 9:15 AM  Patient: Kayla Fiore  Encounter Provider(s): Jayesh Geller MD       To Whom It May Concern: Michelle Mtz was seen and treated in our department on 9/27/2018.    If you have any questions or concerns, jb

## (undated) NOTE — ED AVS SNAPSHOT
Delvis German   MRN: [id number]    Department:  [facility] Emergency Department   Date of Visit:  9/27/2018           Disclosure     Insurance plans vary and the physician(s) referred by the ER may not be covered by your plan.  Please contact y tell this physician (or your personal doctor if your instructions are to return to your personal doctor) about any new or lasting problems. The primary care or specialist physician will see patients referred from the [facility] Emergency Department.  Jean Davis

## (undated) NOTE — LETTER
03/29/22          To Whom It May Concern:        I have done  [address] [facility] annual physical exam and she is in good health. I find no restrictions to her job as a .     Feel free to call with any questions,          [id number] [address]

## (undated) NOTE — LETTER
01/13/20        [address] [facility] [address]      Dear Pool Leal,    [address] [facility] records indicate that you have outstanding lab work and or testing that was ordered for you and has not yet been completed:  Orders Placed This Encounter

## (undated) NOTE — Clinical Note
Date: 6/5/2017    Patient Name: Jimenez De La Cruz          To Whom it may concern: This letter has been written at the patient's request. The above patient was seen at the [facility] for Multiple Sclerosis.  If needed she can have local ane

## (undated) NOTE — LETTER
October 7, 2017    Patient: Kevin Clarke   Date of Visit: 10/7/2017       To Whom It May Concern: Marlen Chawla was seen and treated in our emergency department on 10/7/2017. She should not return to work until 10/11/2017.     If you have any

## (undated) NOTE — ED AVS SNAPSHOT
Adelaide Shepard   MRN: [id number]    Department:  [facility] Emergency Department   Date of Visit:  2/16/2020           Disclosure     Insurance plans vary and the physician(s) referred by the ER may not be covered by your plan.  Please contac CARE PHYSICIAN AT ONCE OR RETURN IMMEDIATELY TO THE EMERGENCY DEPARTMENT. If you have been prescribed any medication(s), please fill your prescription right away and begin taking the medication(s) as directed.   If you believe that any of the medications

## (undated) NOTE — MR AVS SNAPSHOT
[address] [id number]               Thank you for choosing us for your health care visit with Louis Ho MD.  We are glad to serve you and happy to provide you with this ? EFFECTIVE April 1, 2017 PATIENTS MUST  THEIR OWN NARCOTIC PRESCRIPTIONS. ? Written prescriptions must be picked up in office. ? Please allow the office 2-3 business days to fill the prescription. ?  Patient must present photo ID at time of ADVIL 200 MG Tabs   Generic drug:  ibuprofen   Take 200 mg by mouth as needed for Pain. PLEGRIDY 125 MCG/0.5ML Sopn   Generic drug:  Peginterferon Beta-1a   What changed:  Another medication with the same name was removed.  Continue taki Tips for increasing your physical activity – Adults who are physically active are less likely to develop some chronic diseases than adults who are inactive.      HOW TO GET STARTED: HOW TO STAY MOTIVATED:   Start activities slowly and build up over time Do

## (undated) NOTE — LETTER
10/19/17    Candida Thompson        10/19/2017    Pascale Lockett        To Whom It May Concern: Pascale Lockett was seen and treated in my office today. Please excuse them from work/school from 10/19/17.         Feel free to call me with any ques

## (undated) NOTE — Clinical Note
Date: 3/31/2025    Patient Name: Candida Thomason          To Whom it may concern:    This letter has been written at the patient's request. The above patient was seen at [facility] for treatment of a medical condition.    This patient should be excused from attending work/school from *** through ***.    The patient may return to work/school on *** with the following limitations ***.        Sincerely,    Lizy Gerardo, DO

## (undated) NOTE — ED AVS SNAPSHOT
Alfred Combs   MRN: [id number]    Department:  [facility] Emergency Department in [place]   Date of Visit:  1/15/2019           Disclosure     Insurance plans vary and the physician(s) referred by the ER may not be covered by your plan.  Please cont tell this physician (or your personal doctor if your instructions are to return to your personal doctor) about any new or lasting problems. The primary care or specialist physician will see patients referred from the [facility] Emergency Department.  Jose Morrow

## (undated) NOTE — LETTER
Date: 4/5/2021    Patient Name: Guera Melo        To Whom It May Concern:     This letter has been written at the patient's request. The above patient is under my care for Multiple sclerosis. She remains neurologically stable.  She may continue her c

## (undated) NOTE — LETTER
02/16/18    To Whom it may concern:     This letter has been written at the patient's request. The above patient is under my care for Multiple sclerosis. She is neurologically intact and her disease is stable.  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

## (undated) NOTE — LETTER
19          Lianna Garcia  :  1970      To Whom It May Concern:     This letter has been written at the patient's request. The above patient is under my care for Multiple sclerosis. She remains neurologically stable.  She may continue her

## (undated) NOTE — LETTER
06/30/20        [place] [address]      Dear Renata Avila,    [address] [facility] records indicate that you have outstanding lab work and or testing that was ordered for you and has not yet been completed:  Orders Placed This Encounter

## (undated) NOTE — LETTER
07/09/20        [place] [address]      Dear [place] Room,    [facility] [facility] records indicate that you have outstanding lab work and or testing that was ordered for you and has not yet been completed:  Orders Placed This Encounter

## (undated) NOTE — Clinical Note
[facility], [address] [place] [address] [id number] [phone]

## (undated) NOTE — ED AVS SNAPSHOT
lFaquita Braga   MRN: [id number]    Department:  [facility] [place]   Date of Visit:  10/7/2017           Disclosure     Insurance plans vary and the physician(s) referred by the ER may not be covered by your plan.  Please cont If you have been prescribed any medication(s), please fill your prescription right away and begin taking the medication(s) as directed    If the emergency physician has read X-rays, these will be re-interpreted by a radiologist.  If there is a significant

## (undated) NOTE — LETTER
9/30/2019      [address] [place] [address]    Dear Tricia Cho,    We are contacting you from Dr. Apple Fowler office. Your health is important to us.   Therefore, we are sending this friendly reminder that you have the following

## (undated) NOTE — LETTER
1/2/2019       To Whom it may concern:     This letter has been written at the patient's request. The above patient is under my care for Multiple sclerosis. She is neurologically intact and her disease remains stable.  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

## (undated) NOTE — Clinical Note
Jerry Santoyo - I saw Marcelle Gomez today with constipation, vulvovag atrophy, pelvic muscle weakness. I've recommended vag estrogen, bowel mgmt, and pelvic floor PT. I will work to manage her sx. I appreciate the opportunity to participate in her care.  Thanks, [employer]

## (undated) NOTE — LETTER
Date: 3/14/2023    Patient Name: Norris Garcia          To Whom it may concern: This letter has been written at the patient's request. The above patient was seen at the [facility] for treatment of a medical condition. The patient may continue working with out any physical limitations.          Sincerely,    Talib Norton, DO